# Patient Record
Sex: FEMALE | Race: WHITE | NOT HISPANIC OR LATINO | Employment: PART TIME | URBAN - NONMETROPOLITAN AREA
[De-identification: names, ages, dates, MRNs, and addresses within clinical notes are randomized per-mention and may not be internally consistent; named-entity substitution may affect disease eponyms.]

---

## 2023-05-01 PROBLEM — J32.9 SINUSITIS: Status: RESOLVED | Noted: 2023-05-01 | Resolved: 2023-05-01

## 2023-05-01 PROBLEM — O21.9 NAUSEA AND VOMITING DURING PREGNANCY (HHS-HCC): Status: RESOLVED | Noted: 2023-05-01 | Resolved: 2023-05-01

## 2023-05-01 PROBLEM — U07.1 COVID-19: Status: RESOLVED | Noted: 2023-05-01 | Resolved: 2023-05-01

## 2023-05-01 PROBLEM — M54.2 NECK PAIN: Status: ACTIVE | Noted: 2023-05-01

## 2023-05-01 PROBLEM — O91.219 MASTITIS, OBSTETRIC (HHS-HCC): Status: ACTIVE | Noted: 2023-05-01

## 2023-05-01 PROBLEM — R53.83 FATIGUE: Status: RESOLVED | Noted: 2023-05-01 | Resolved: 2023-05-01

## 2023-05-01 PROBLEM — J06.9 URI (UPPER RESPIRATORY INFECTION): Status: RESOLVED | Noted: 2023-05-01 | Resolved: 2023-05-01

## 2023-05-01 PROBLEM — E55.9 VITAMIN D DEFICIENCY: Status: ACTIVE | Noted: 2023-05-01

## 2023-05-01 PROBLEM — M50.20 HERNIATION OF INTERVERTEBRAL DISC OF CERVICAL SPINE WITHOUT RADICULOPATHY: Status: ACTIVE | Noted: 2023-05-01

## 2023-05-01 PROBLEM — F32.A DEPRESSION: Status: ACTIVE | Noted: 2023-05-01

## 2023-05-01 RX ORDER — DOXYLAMINE SUCCINATE AND PYRIDOXINE HYDROCHLORIDE 20; 20 MG/1; MG/1
TABLET, EXTENDED RELEASE ORAL
COMMUNITY
Start: 2019-06-20

## 2023-05-01 RX ORDER — ESCITALOPRAM OXALATE 10 MG/1
1 TABLET ORAL DAILY
COMMUNITY
Start: 2021-07-20

## 2023-05-01 RX ORDER — LORAZEPAM 0.5 MG/1
1 TABLET ORAL DAILY PRN
COMMUNITY
Start: 2021-06-08 | End: 2023-05-02 | Stop reason: SDUPTHER

## 2023-05-02 ENCOUNTER — OFFICE VISIT (OUTPATIENT)
Dept: PRIMARY CARE | Facility: CLINIC | Age: 33
End: 2023-05-02
Payer: COMMERCIAL

## 2023-05-02 VITALS
DIASTOLIC BLOOD PRESSURE: 71 MMHG | HEART RATE: 61 BPM | WEIGHT: 192.8 LBS | SYSTOLIC BLOOD PRESSURE: 102 MMHG | HEIGHT: 63 IN | BODY MASS INDEX: 34.16 KG/M2

## 2023-05-02 DIAGNOSIS — F32.A ANXIETY AND DEPRESSION: Primary | ICD-10-CM

## 2023-05-02 DIAGNOSIS — F41.9 ANXIETY AND DEPRESSION: Primary | ICD-10-CM

## 2023-05-02 PROCEDURE — 99213 OFFICE O/P EST LOW 20 MIN: CPT | Performed by: REGISTERED NURSE

## 2023-05-02 PROCEDURE — 1036F TOBACCO NON-USER: CPT | Performed by: REGISTERED NURSE

## 2023-05-02 RX ORDER — FLUOXETINE HYDROCHLORIDE 20 MG/1
20 CAPSULE ORAL DAILY
Qty: 30 CAPSULE | Refills: 1 | Status: SHIPPED | OUTPATIENT
Start: 2023-05-02 | End: 2023-07-01

## 2023-05-02 RX ORDER — IBUPROFEN 600 MG/1
TABLET ORAL
COMMUNITY
Start: 2022-09-15

## 2023-05-02 RX ORDER — FERROUS SULFATE TAB 325 MG (65 MG ELEMENTAL FE) 325 (65 FE) MG
1 TAB ORAL 2 TIMES DAILY
COMMUNITY
Start: 2022-07-20

## 2023-05-02 RX ORDER — LORAZEPAM 0.5 MG/1
0.5 TABLET ORAL EVERY 6 HOURS PRN
Qty: 28 TABLET | Refills: 0 | Status: SHIPPED | OUTPATIENT
Start: 2023-05-02 | End: 2023-05-09

## 2023-05-02 RX ORDER — OXYCODONE HYDROCHLORIDE 5 MG/1
TABLET ORAL
COMMUNITY
Start: 2022-09-15

## 2023-05-02 RX ORDER — ALBUTEROL SULFATE 90 UG/1
AEROSOL, METERED RESPIRATORY (INHALATION)
COMMUNITY
Start: 2022-07-03

## 2023-05-02 ASSESSMENT — ENCOUNTER SYMPTOMS
CONSTIPATION: 0
CONFUSION: 0
NERVOUS/ANXIOUS: 0
DIFFICULTY URINATING: 0
WEAKNESS: 0
EYE DISCHARGE: 0
COUGH: 0
WHEEZING: 0
HEADACHES: 0
CHILLS: 0
WOUND: 0
DIZZINESS: 0
NAUSEA: 0
SHORTNESS OF BREATH: 0
EYE REDNESS: 0
DIARRHEA: 0
FREQUENCY: 0
RHINORRHEA: 0
FEVER: 0
VOMITING: 0
ABDOMINAL PAIN: 0

## 2023-05-02 NOTE — PROGRESS NOTES
"Subjective   Patient ID: Kirti Mckoy is a 32 y.o. female who presents for Anxiety (Pt presents feeling anxiety; stress; would like to go back on mediation, fluoxetine;  ).    Anxiety  Patient reports no chest pain, confusion, dizziness, nausea, nervous/anxious behavior or shortness of breath.     Anxiety: She had stopped her medication for about a year or so. She states \"Life is catching up with me.\" And would like to go back on medication. She had previously been on fluoxitine and escitalopram in the past. She would like to try fluoxetine again. She states her son recently got diagnosed with autism so she is trying to deal with it.     Anxiety and depression: She states that she feels that she is functioning reasonably well, still feeling depressed and anxious. She is having bad days where she is scared to leave the house. She did go on her trip and she enjoyed some of it. She feels like she is dealing with more symptoms of PTSD at this point. Difficult to tell if the fluoxetine is helping, she is getting HA now that she didn't get before. She is struggling with flashbacks.      All other systems have been reviewed and are negative for complaint     Review of Systems   Constitutional:  Negative for chills and fever.   HENT:  Negative for congestion, ear pain and rhinorrhea.    Eyes:  Negative for discharge and redness.   Respiratory:  Negative for cough, shortness of breath and wheezing.    Cardiovascular:  Negative for chest pain and leg swelling.   Gastrointestinal:  Negative for abdominal pain, constipation, diarrhea, nausea and vomiting.   Genitourinary:  Negative for difficulty urinating, frequency and urgency.   Musculoskeletal:  Negative for gait problem.   Skin:  Negative for rash and wound.   Neurological:  Negative for dizziness, weakness and headaches.   Psychiatric/Behavioral:  Negative for confusion. The patient is not nervous/anxious.        Objective   /71 (BP Location: Right arm, Patient " "Position: Sitting, BP Cuff Size: Large adult)   Pulse 61   Ht 1.6 m (5' 3\")   Wt 87.5 kg (192 lb 12.8 oz)   BMI 34.15 kg/m²     Physical Exam  Vitals reviewed.   Constitutional:       Appearance: Normal appearance.   HENT:      Head: Normocephalic.      Right Ear: Tympanic membrane, ear canal and external ear normal.      Left Ear: Tympanic membrane, ear canal and external ear normal.      Nose: No rhinorrhea.      Mouth/Throat:      Mouth: Mucous membranes are moist.      Pharynx: Oropharynx is clear.   Eyes:      Pupils: Pupils are equal, round, and reactive to light.   Cardiovascular:      Rate and Rhythm: Normal rate and regular rhythm.      Pulses: Normal pulses.   Pulmonary:      Effort: Pulmonary effort is normal.      Breath sounds: Normal breath sounds.   Abdominal:      General: Abdomen is flat. Bowel sounds are normal.      Palpations: Abdomen is soft.   Musculoskeletal:         General: No tenderness. Normal range of motion.      Right lower leg: No edema.      Left lower leg: No edema.   Lymphadenopathy:      Cervical: No cervical adenopathy.   Skin:     General: Skin is warm and dry.      Findings: No rash.   Neurological:      Mental Status: She is alert and oriented to person, place, and time.   Psychiatric:         Mood and Affect: Mood normal.         Behavior: Behavior normal.       Assessment/Plan        #Depression  Would like to try fluoxetine again   May need to change to lexapro if SE's   lorazepam has been helpful, refilled    followup 6 weeks   "

## 2023-06-16 ENCOUNTER — APPOINTMENT (OUTPATIENT)
Dept: PRIMARY CARE | Facility: CLINIC | Age: 33
End: 2023-06-16
Payer: COMMERCIAL

## 2023-11-17 ENCOUNTER — TELEMEDICINE (OUTPATIENT)
Dept: PRIMARY CARE | Facility: CLINIC | Age: 33
End: 2023-11-17
Payer: COMMERCIAL

## 2023-11-17 DIAGNOSIS — J01.90 ACUTE SINUSITIS, RECURRENCE NOT SPECIFIED, UNSPECIFIED LOCATION: Primary | ICD-10-CM

## 2023-11-17 PROCEDURE — 99442 PR PHYS/QHP TELEPHONE EVALUATION 11-20 MIN: CPT | Performed by: FAMILY MEDICINE

## 2023-11-17 RX ORDER — AMOXICILLIN 500 MG/1
500 CAPSULE ORAL EVERY 12 HOURS SCHEDULED
Qty: 20 CAPSULE | Refills: 0 | Status: SHIPPED | OUTPATIENT
Start: 2023-11-17 | End: 2023-11-27

## 2023-11-17 NOTE — PROGRESS NOTES
Subjective   Patient ID: Kirti Mckoy is a 32 y.o. female who presents for No chief complaint on file..  HPI    Acute sinusitis: Started about a week ago. Nasal congestion, sneezing, coughing, fatigue. For the last 2 or 3 days drainage has turned tan/green. Low grade fevers. She found out she was pregnant a week ago. No nausea. No vomiting or diarrhea. +Sinus pressure in forehead and cheeks. No sore throat. Has not taken a COVID test.     Review of Systems    Objective   Physical Exam    Assessment/Plan        #Acute sinusitis  Rx sent amoxicillin  Fluids, rest, tylenol prn

## 2023-12-16 PROCEDURE — 87624 HPV HI-RISK TYP POOLED RSLT: CPT

## 2023-12-16 PROCEDURE — 88175 CYTOPATH C/V AUTO FLUID REDO: CPT

## 2023-12-16 PROCEDURE — 87800 DETECT AGNT MULT DNA DIREC: CPT

## 2023-12-19 ENCOUNTER — LAB REQUISITION (OUTPATIENT)
Dept: LAB | Facility: HOSPITAL | Age: 33
End: 2023-12-19
Payer: COMMERCIAL

## 2023-12-19 DIAGNOSIS — Z12.4 ENCOUNTER FOR SCREENING FOR MALIGNANT NEOPLASM OF CERVIX: ICD-10-CM

## 2023-12-19 DIAGNOSIS — Z34.81 ENCOUNTER FOR SUPERVISION OF OTHER NORMAL PREGNANCY, FIRST TRIMESTER (HHS-HCC): ICD-10-CM

## 2023-12-19 DIAGNOSIS — Z11.51 ENCOUNTER FOR SCREENING FOR HUMAN PAPILLOMAVIRUS (HPV): ICD-10-CM

## 2023-12-19 LAB
C TRACH RRNA SPEC QL NAA+PROBE: NEGATIVE
N GONORRHOEA DNA SPEC QL PROBE+SIG AMP: NEGATIVE

## 2023-12-27 ENCOUNTER — LAB (OUTPATIENT)
Dept: LAB | Facility: LAB | Age: 33
End: 2023-12-27
Payer: COMMERCIAL

## 2023-12-27 DIAGNOSIS — Z13.79 ENCOUNTER FOR OTHER SCREENING FOR GENETIC AND CHROMOSOMAL ANOMALIES: Primary | ICD-10-CM

## 2023-12-27 PROCEDURE — 36415 COLL VENOUS BLD VENIPUNCTURE: CPT

## 2024-01-04 DIAGNOSIS — Z34.81 ENCOUNTER FOR SUPERVISION OF OTHER NORMAL PREGNANCY, FIRST TRIMESTER (HHS-HCC): Primary | ICD-10-CM

## 2024-01-05 LAB
CYTOLOGY CMNT CVX/VAG CYTO-IMP: NORMAL
HPV HR 12 DNA GENITAL QL NAA+PROBE: NEGATIVE
HPV HR GENOTYPES PNL CVX NAA+PROBE: NEGATIVE
HPV16 DNA SPEC QL NAA+PROBE: NEGATIVE
HPV18 DNA SPEC QL NAA+PROBE: NEGATIVE
LAB AP HPV GENOTYPE QUESTION: YES
LAB AP HPV HR: NORMAL
LAB AP PAP ADDITIONAL TESTS: NORMAL
LABORATORY COMMENT REPORT: NORMAL
LMP START DATE: NORMAL
PATH REPORT.TOTAL CANCER: NORMAL

## 2024-01-10 ENCOUNTER — LAB (OUTPATIENT)
Dept: LAB | Facility: LAB | Age: 34
End: 2024-01-10
Payer: COMMERCIAL

## 2024-01-10 DIAGNOSIS — Z34.81 ENCOUNTER FOR SUPERVISION OF OTHER NORMAL PREGNANCY, FIRST TRIMESTER (HHS-HCC): ICD-10-CM

## 2024-01-10 LAB
ABO GROUP (TYPE) IN BLOOD: NORMAL
ANTIBODY SCREEN: NORMAL
ERYTHROCYTE [DISTWIDTH] IN BLOOD BY AUTOMATED COUNT: 12.3 % (ref 11.5–14.5)
HCT VFR BLD AUTO: 36.7 % (ref 36–46)
HGB BLD-MCNC: 12.4 G/DL (ref 12–16)
MCH RBC QN AUTO: 29.6 PG (ref 26–34)
MCHC RBC AUTO-ENTMCNC: 33.8 G/DL (ref 32–36)
MCV RBC AUTO: 88 FL (ref 80–100)
NRBC BLD-RTO: 0 /100 WBCS (ref 0–0)
PLATELET # BLD AUTO: 233 X10*3/UL (ref 150–450)
RBC # BLD AUTO: 4.19 X10*6/UL (ref 4–5.2)
RH FACTOR (ANTIGEN D): NORMAL
WBC # BLD AUTO: 5.6 X10*3/UL (ref 4.4–11.3)

## 2024-01-10 PROCEDURE — 86780 TREPONEMA PALLIDUM: CPT

## 2024-01-10 PROCEDURE — 87389 HIV-1 AG W/HIV-1&-2 AB AG IA: CPT

## 2024-01-10 PROCEDURE — 83036 HEMOGLOBIN GLYCOSYLATED A1C: CPT

## 2024-01-10 PROCEDURE — 87340 HEPATITIS B SURFACE AG IA: CPT

## 2024-01-10 PROCEDURE — 86803 HEPATITIS C AB TEST: CPT

## 2024-01-10 PROCEDURE — 86900 BLOOD TYPING SEROLOGIC ABO: CPT

## 2024-01-10 PROCEDURE — 86901 BLOOD TYPING SEROLOGIC RH(D): CPT

## 2024-01-10 PROCEDURE — 85027 COMPLETE CBC AUTOMATED: CPT

## 2024-01-10 PROCEDURE — 86850 RBC ANTIBODY SCREEN: CPT

## 2024-01-10 PROCEDURE — 86317 IMMUNOASSAY INFECTIOUS AGENT: CPT

## 2024-01-10 PROCEDURE — 36415 COLL VENOUS BLD VENIPUNCTURE: CPT

## 2024-01-10 PROCEDURE — 87086 URINE CULTURE/COLONY COUNT: CPT

## 2024-01-11 LAB
BACTERIA UR CULT: NORMAL
EST. AVERAGE GLUCOSE BLD GHB EST-MCNC: 94 MG/DL
HBA1C MFR BLD: 4.9 %
HBV SURFACE AG SERPL QL IA: NONREACTIVE
HCV AB SER QL: NONREACTIVE
HIV 1+2 AB+HIV1 P24 AG SERPL QL IA: NONREACTIVE
RUBV IGG SERPL IA-ACNC: 3.3 IA
RUBV IGG SERPL QL IA: POSITIVE
T PALLIDUM AB SER QL: NONREACTIVE

## 2024-01-31 LAB — SCAN RESULT: NORMAL

## 2024-04-24 ENCOUNTER — LAB (OUTPATIENT)
Dept: LAB | Facility: LAB | Age: 34
End: 2024-04-24
Payer: COMMERCIAL

## 2024-04-24 DIAGNOSIS — Z34.82 ENCOUNTER FOR SUPERVISION OF OTHER NORMAL PREGNANCY, SECOND TRIMESTER (HHS-HCC): Primary | ICD-10-CM

## 2024-04-24 LAB
ERYTHROCYTE [DISTWIDTH] IN BLOOD BY AUTOMATED COUNT: 12.1 % (ref 11.5–14.5)
GLUCOSE 1H P 50 G GLC PO SERPL-MCNC: 83 MG/DL
HCT VFR BLD AUTO: 31.4 % (ref 36–46)
HGB BLD-MCNC: 10.1 G/DL (ref 12–16)
MCH RBC QN AUTO: 28.1 PG (ref 26–34)
MCHC RBC AUTO-ENTMCNC: 32.2 G/DL (ref 32–36)
MCV RBC AUTO: 87 FL (ref 80–100)
NRBC BLD-RTO: 0 /100 WBCS (ref 0–0)
PLATELET # BLD AUTO: 225 X10*3/UL (ref 150–450)
RBC # BLD AUTO: 3.6 X10*6/UL (ref 4–5.2)
WBC # BLD AUTO: 8 X10*3/UL (ref 4.4–11.3)

## 2024-04-24 PROCEDURE — 85027 COMPLETE CBC AUTOMATED: CPT

## 2024-04-24 PROCEDURE — 36415 COLL VENOUS BLD VENIPUNCTURE: CPT

## 2024-04-24 PROCEDURE — 82947 ASSAY GLUCOSE BLOOD QUANT: CPT

## 2024-05-09 ENCOUNTER — APPOINTMENT (OUTPATIENT)
Dept: RADIOLOGY | Facility: HOSPITAL | Age: 34
End: 2024-05-09
Payer: COMMERCIAL

## 2024-05-09 ENCOUNTER — HOSPITAL ENCOUNTER (EMERGENCY)
Facility: HOSPITAL | Age: 34
Discharge: HOME | End: 2024-05-09
Attending: STUDENT IN AN ORGANIZED HEALTH CARE EDUCATION/TRAINING PROGRAM
Payer: COMMERCIAL

## 2024-05-09 VITALS
SYSTOLIC BLOOD PRESSURE: 110 MMHG | BODY MASS INDEX: 37.81 KG/M2 | RESPIRATION RATE: 18 BRPM | WEIGHT: 205.47 LBS | HEART RATE: 87 BPM | HEIGHT: 62 IN | DIASTOLIC BLOOD PRESSURE: 68 MMHG | TEMPERATURE: 98.4 F | OXYGEN SATURATION: 98 %

## 2024-05-09 DIAGNOSIS — M79.605 PAIN OF LEFT LOWER EXTREMITY: Primary | ICD-10-CM

## 2024-05-09 LAB
ANION GAP SERPL CALC-SCNC: 9 MMOL/L
BASOPHILS # BLD AUTO: 0.02 X10*3/UL (ref 0–0.1)
BASOPHILS NFR BLD AUTO: 0.3 %
BUN SERPL-MCNC: 6 MG/DL (ref 8–25)
CALCIUM SERPL-MCNC: 8.5 MG/DL (ref 8.5–10.4)
CHLORIDE SERPL-SCNC: 107 MMOL/L (ref 97–107)
CO2 SERPL-SCNC: 21 MMOL/L (ref 24–31)
CREAT SERPL-MCNC: 0.4 MG/DL (ref 0.4–1.6)
EGFRCR SERPLBLD CKD-EPI 2021: >90 ML/MIN/1.73M*2
EOSINOPHIL # BLD AUTO: 0.02 X10*3/UL (ref 0–0.7)
EOSINOPHIL NFR BLD AUTO: 0.3 %
ERYTHROCYTE [DISTWIDTH] IN BLOOD BY AUTOMATED COUNT: 12.4 % (ref 11.5–14.5)
GLUCOSE SERPL-MCNC: 77 MG/DL (ref 65–99)
HCT VFR BLD AUTO: 30 % (ref 36–46)
HGB BLD-MCNC: 9.5 G/DL (ref 12–16)
IMM GRANULOCYTES # BLD AUTO: 0.04 X10*3/UL (ref 0–0.7)
IMM GRANULOCYTES NFR BLD AUTO: 0.5 % (ref 0–0.9)
LYMPHOCYTES # BLD AUTO: 1.36 X10*3/UL (ref 1.2–4.8)
LYMPHOCYTES NFR BLD AUTO: 17.8 %
MCH RBC QN AUTO: 27.3 PG (ref 26–34)
MCHC RBC AUTO-ENTMCNC: 31.7 G/DL (ref 32–36)
MCV RBC AUTO: 86 FL (ref 80–100)
MONOCYTES # BLD AUTO: 0.57 X10*3/UL (ref 0.1–1)
MONOCYTES NFR BLD AUTO: 7.5 %
NEUTROPHILS # BLD AUTO: 5.61 X10*3/UL (ref 1.2–7.7)
NEUTROPHILS NFR BLD AUTO: 73.6 %
NRBC BLD-RTO: 0 /100 WBCS (ref 0–0)
PLATELET # BLD AUTO: 185 X10*3/UL (ref 150–450)
POTASSIUM SERPL-SCNC: 3.8 MMOL/L (ref 3.4–5.1)
RBC # BLD AUTO: 3.48 X10*6/UL (ref 4–5.2)
SODIUM SERPL-SCNC: 137 MMOL/L (ref 133–145)
WBC # BLD AUTO: 7.6 X10*3/UL (ref 4.4–11.3)

## 2024-05-09 PROCEDURE — 80048 BASIC METABOLIC PNL TOTAL CA: CPT

## 2024-05-09 PROCEDURE — 93971 EXTREMITY STUDY: CPT | Performed by: RADIOLOGY

## 2024-05-09 PROCEDURE — 93971 EXTREMITY STUDY: CPT

## 2024-05-09 PROCEDURE — 36415 COLL VENOUS BLD VENIPUNCTURE: CPT

## 2024-05-09 PROCEDURE — 99284 EMERGENCY DEPT VISIT MOD MDM: CPT | Mod: 25

## 2024-05-09 PROCEDURE — 85025 COMPLETE CBC W/AUTO DIFF WBC: CPT

## 2024-05-09 ASSESSMENT — PAIN DESCRIPTION - PAIN TYPE: TYPE: ACUTE PAIN

## 2024-05-09 ASSESSMENT — PAIN DESCRIPTION - DESCRIPTORS: DESCRIPTORS: ACHING

## 2024-05-09 ASSESSMENT — COLUMBIA-SUICIDE SEVERITY RATING SCALE - C-SSRS
6. HAVE YOU EVER DONE ANYTHING, STARTED TO DO ANYTHING, OR PREPARED TO DO ANYTHING TO END YOUR LIFE?: NO
2. HAVE YOU ACTUALLY HAD ANY THOUGHTS OF KILLING YOURSELF?: NO
1. IN THE PAST MONTH, HAVE YOU WISHED YOU WERE DEAD OR WISHED YOU COULD GO TO SLEEP AND NOT WAKE UP?: NO

## 2024-05-09 ASSESSMENT — PAIN DESCRIPTION - FREQUENCY: FREQUENCY: CONSTANT/CONTINUOUS

## 2024-05-09 ASSESSMENT — PAIN DESCRIPTION - ONSET: ONSET: GRADUAL

## 2024-05-09 ASSESSMENT — PAIN SCALES - GENERAL: PAINLEVEL_OUTOF10: 3

## 2024-05-09 ASSESSMENT — PAIN - FUNCTIONAL ASSESSMENT: PAIN_FUNCTIONAL_ASSESSMENT: 0-10

## 2024-05-09 ASSESSMENT — PAIN DESCRIPTION - LOCATION: LOCATION: LEG

## 2024-05-09 ASSESSMENT — PAIN DESCRIPTION - ORIENTATION: ORIENTATION: LEFT;LOWER

## 2024-05-09 ASSESSMENT — PAIN DESCRIPTION - PROGRESSION: CLINICAL_PROGRESSION: GRADUALLY WORSENING

## 2024-05-09 NOTE — ED PROVIDER NOTES
[facility]   Chief Complaint   Patient presents with    Leg Pain     Left calf pain since yesterday, no known trauma, no SOB, localized swelling, 30 weeks pregnant, pt has had sciatica issues, yesterday the pain went further up her leg, pt did walk in       HPI  Is a 33-year-old female who is 30 weeks pregnant who presents to ED for left leg pain.  Patient describes pain located behind left calf going up left thigh.  She denies any history of blood clots.  Denies any chest pain or shortness of breath, headache or dizziness, neurologic deficits.  Denies any trauma to the leg.                  No data recorded                   Patient History   Past Medical History:   Diagnosis Date    COVID-19 05/01/2023    Nausea and vomiting during pregnancy ([facility]-HCC) 05/01/2023    Sinusitis 05/01/2023    URI (upper respiratory infection) 05/01/2023     No past surgical history on file.  No family history on file.  Social History     Tobacco Use    Smoking status: Never     Passive exposure: Never    Smokeless tobacco: Never   Substance Use Topics    Alcohol use: Not Currently    Drug use: Never       Physical Exam   ED Triage Vitals [05/09/24 1042]   Temperature Heart Rate Respirations BP   36.9 °C (98.4 °F) 87 16 118/75      Pulse Ox Temp Source Heart Rate Source Patient Position   99 % Oral Monitor Sitting      BP Location FiO2 (%)     Right arm --       Physical Exam  Vitals reviewed.   Constitutional:       Appearance: Normal appearance.   HENT:      Head: Atraumatic.   Eyes:      Extraocular Movements: Extraocular movements intact.   Cardiovascular:      Rate and Rhythm: Normal rate and regular rhythm.   Pulmonary:      Effort: Pulmonary effort is normal.      Breath sounds: Normal breath sounds.   Musculoskeletal:         General: Normal range of motion.      Cervical back: Normal range of motion and neck supple.      Right lower leg: No edema.      Left lower leg: No edema.   Skin:     General: Skin is warm and dry.    Neurological:      General: No focal deficit present.      Mental Status: She is alert and oriented to person, place, and time.   Psychiatric:         Mood and Affect: Mood normal.         Behavior: Behavior normal.         ED Course & MDM   Diagnoses as of 05/09/24 1412   Pain of left lower extremity       Medical Decision Making  Parts of this chart have been completed using voice recognition software. Please excuse any errors of transcription.  My thought process and reason for plan has been formulated from the time that I saw the patient until the time of disposition and is not specific to one specific moment during their visit and furthermore my MDM encompasses this entire chart and not only this text box.    HPI:   Detailed above.    Exam:   A medically appropriate exam performed, outlined above, given the known history and presentation.    History obtained from:   Patient    EKG/Cardiac monitor:       Social Determinants of Health considered during this visit:       Medications given during visit:  Medications - No data to display     Diagnostic/tests:  Labs Reviewed   CBC WITH AUTO DIFFERENTIAL - Abnormal       Result Value    WBC 7.6      nRBC 0.0      RBC 3.48 (*)     Hemoglobin 9.5 (*)     Hematocrit 30.0 (*)     MCV 86      MCH 27.3      MCHC 31.7 (*)     RDW 12.4      Platelets 185      Neutrophils % 73.6      Immature Granulocytes %, Automated 0.5      Lymphocytes % 17.8      Monocytes % 7.5      Eosinophils % 0.3      Basophils % 0.3      Neutrophils Absolute 5.61      Immature Granulocytes Absolute, Automated 0.04      Lymphocytes Absolute 1.36      Monocytes Absolute 0.57      Eosinophils Absolute 0.02      Basophils Absolute 0.02     BASIC METABOLIC PANEL - Abnormal    Glucose 77      Sodium 137      Potassium 3.8      Chloride 107      Bicarbonate 21 (*)     Urea Nitrogen 6 (*)     Creatinine 0.40      eGFR >90      Calcium 8.5      Anion Gap 9        Lower extremity venous duplex left   Final  Result   No deep venous thrombosis of the  left lower extremity.        MACRO:   None        Signed by: Neli Jimenez 5/9/2024 12:02 PM   Dictation workstation:   [id number]           Differential Diagnosis:   As I have deemed necessary from their history, physical and studies, I have considered the following diagnoses:     DEEP VEIN THROMBOSIS  CELLULITIS  PERIPHERAL ARTERIAL DISEASE  ARTERIAL EMBOLISM CAUSING LIMB ISCHEMIA  BUERGER'S DISEASE  LIMB TRAUMA  NEUROSPINAL OR NEUROPATHIC PAIN  MUSCKULOSKELETAL PAIN  CHRONIC VENOUS DISEASE    Well's Score for DVT: -2  Active cancer (+1)  Bedridden > 3 days or major surgery within 12 weeks (+1)  Calf swelling > 3cm compared to other leg (+1)  Collateral superficial veins present (+1)  Entire leg swollen (+1)  Localized venous tenderness (+1)  Unilateral pitting edema (+1)  Paralysis of lower extremity (+1)  Prior DVT (+1)  Alternate diagnosis more likely (-2)      Consultations:      Procedures:      Critical Care:      Referrals:      Discharge Prescriptions:      MDM Summary:  DVT study is negative.  Very low suspicion for deep vein thrombosis.  Return precautions were discussed with patient.  She is safe for discharge.    We have discussed the diagnosis and risks, and we agree with discharging home to follow-up with appropriate physician as directed. We also discussed returning to the Emergency Department immediately if new or worsening symptoms occur. We have discussed the symptoms which are most concerning that necessitate immediate return. Pt symptoms have been well controlled here and the patient is safe for discharge with appropriate outpatient follow up. The patient has verbalized understanding to return to ER without delay for new or worsening pains or for any other symptoms or concerns. I utilized the discharge clinical management tool provided [employer] to help estimate risk of negative outcome for this patient.      Disposition:  The patient  was discharged      Procedure  Procedures     Osmany Martinez PA-C  05/09/24 6222

## 2024-06-25 ENCOUNTER — LAB REQUISITION (OUTPATIENT)
Dept: LAB | Facility: HOSPITAL | Age: 34
End: 2024-06-25
Payer: COMMERCIAL

## 2024-06-25 DIAGNOSIS — Z34.82 ENCOUNTER FOR SUPERVISION OF OTHER NORMAL PREGNANCY, SECOND TRIMESTER (HHS-HCC): ICD-10-CM

## 2024-06-25 PROCEDURE — 87081 CULTURE SCREEN ONLY: CPT

## 2024-06-28 LAB — GP B STREP GENITAL QL CULT: NORMAL

## 2024-07-12 ENCOUNTER — HOSPITAL ENCOUNTER (INPATIENT)
Facility: HOSPITAL | Age: 34
LOS: 3 days | Discharge: HOME | End: 2024-07-15
Attending: STUDENT IN AN ORGANIZED HEALTH CARE EDUCATION/TRAINING PROGRAM | Admitting: STUDENT IN AN ORGANIZED HEALTH CARE EDUCATION/TRAINING PROGRAM
Payer: COMMERCIAL

## 2024-07-12 ENCOUNTER — ANESTHESIA EVENT (OUTPATIENT)
Dept: OBSTETRICS AND GYNECOLOGY | Facility: HOSPITAL | Age: 34
End: 2024-07-12
Payer: COMMERCIAL

## 2024-07-12 DIAGNOSIS — G89.18 POST-OP PAIN: ICD-10-CM

## 2024-07-12 DIAGNOSIS — Z34.90 TERM PREGNANCY (HHS-HCC): ICD-10-CM

## 2024-07-12 PROBLEM — O42.90 PREMATURE RUPTURE OF MEMBRANES (HHS-HCC): Status: ACTIVE | Noted: 2024-07-12

## 2024-07-12 LAB
ABO GROUP (TYPE) IN BLOOD: NORMAL
ABO GROUP (TYPE) IN BLOOD: NORMAL
ANTIBODY SCREEN: NORMAL
ERYTHROCYTE [DISTWIDTH] IN BLOOD BY AUTOMATED COUNT: 14.2 % (ref 11.5–14.5)
HCT VFR BLD AUTO: 30.2 % (ref 36–46)
HGB BLD-MCNC: 9.7 G/DL (ref 12–16)
MCH RBC QN AUTO: 24.9 PG (ref 26–34)
MCHC RBC AUTO-ENTMCNC: 32.1 G/DL (ref 32–36)
MCV RBC AUTO: 77 FL (ref 80–100)
NRBC BLD-RTO: 0 /100 WBCS (ref 0–0)
PLATELET # BLD AUTO: 192 X10*3/UL (ref 150–450)
RBC # BLD AUTO: 3.9 X10*6/UL (ref 4–5.2)
RH FACTOR (ANTIGEN D): NORMAL
RH FACTOR (ANTIGEN D): NORMAL
WBC # BLD AUTO: 9 X10*3/UL (ref 4.4–11.3)

## 2024-07-12 PROCEDURE — 2500000001 HC RX 250 WO HCPCS SELF ADMINISTERED DRUGS (ALT 637 FOR MEDICARE OP): Performed by: STUDENT IN AN ORGANIZED HEALTH CARE EDUCATION/TRAINING PROGRAM

## 2024-07-12 PROCEDURE — 86901 BLOOD TYPING SEROLOGIC RH(D): CPT | Performed by: STUDENT IN AN ORGANIZED HEALTH CARE EDUCATION/TRAINING PROGRAM

## 2024-07-12 PROCEDURE — 7100000016 HC LABOR RECOVERY PER HOUR: Performed by: STUDENT IN AN ORGANIZED HEALTH CARE EDUCATION/TRAINING PROGRAM

## 2024-07-12 PROCEDURE — A4649 SURGICAL SUPPLIES: HCPCS | Performed by: STUDENT IN AN ORGANIZED HEALTH CARE EDUCATION/TRAINING PROGRAM

## 2024-07-12 PROCEDURE — 85027 COMPLETE CBC AUTOMATED: CPT | Performed by: STUDENT IN AN ORGANIZED HEALTH CARE EDUCATION/TRAINING PROGRAM

## 2024-07-12 PROCEDURE — 3700000014 HC AN EPIDURAL BLOCK CHARGE: Performed by: STUDENT IN AN ORGANIZED HEALTH CARE EDUCATION/TRAINING PROGRAM

## 2024-07-12 PROCEDURE — 2500000004 HC RX 250 GENERAL PHARMACY W/ HCPCS (ALT 636 FOR OP/ED): Performed by: STUDENT IN AN ORGANIZED HEALTH CARE EDUCATION/TRAINING PROGRAM

## 2024-07-12 PROCEDURE — 36415 COLL VENOUS BLD VENIPUNCTURE: CPT | Performed by: STUDENT IN AN ORGANIZED HEALTH CARE EDUCATION/TRAINING PROGRAM

## 2024-07-12 PROCEDURE — 59514 CESAREAN DELIVERY ONLY: CPT | Performed by: STUDENT IN AN ORGANIZED HEALTH CARE EDUCATION/TRAINING PROGRAM

## 2024-07-12 PROCEDURE — 2720000007 HC OR 272 NO HCPCS: Performed by: STUDENT IN AN ORGANIZED HEALTH CARE EDUCATION/TRAINING PROGRAM

## 2024-07-12 PROCEDURE — 86780 TREPONEMA PALLIDUM: CPT | Mod: TRILAB,WESLAB | Performed by: STUDENT IN AN ORGANIZED HEALTH CARE EDUCATION/TRAINING PROGRAM

## 2024-07-12 PROCEDURE — 59050 FETAL MONITOR W/REPORT: CPT

## 2024-07-12 PROCEDURE — 7210000002 HC LABOR PER HOUR

## 2024-07-12 PROCEDURE — 1220000001 HC OB SEMI-PRIVATE ROOM DAILY

## 2024-07-12 RX ORDER — OXYTOCIN 10 [USP'U]/ML
10 INJECTION, SOLUTION INTRAMUSCULAR; INTRAVENOUS ONCE AS NEEDED
Status: DISCONTINUED | OUTPATIENT
Start: 2024-07-12 | End: 2024-07-13

## 2024-07-12 RX ORDER — NIFEDIPINE 10 MG/1
10 CAPSULE ORAL ONCE AS NEEDED
Status: DISCONTINUED | OUTPATIENT
Start: 2024-07-12 | End: 2024-07-13

## 2024-07-12 RX ORDER — CARBOPROST TROMETHAMINE 250 UG/ML
250 INJECTION, SOLUTION INTRAMUSCULAR ONCE AS NEEDED
Status: DISCONTINUED | OUTPATIENT
Start: 2024-07-12 | End: 2024-07-13

## 2024-07-12 RX ORDER — TRANEXAMIC ACID 100 MG/ML
1000 INJECTION, SOLUTION INTRAVENOUS ONCE AS NEEDED
Status: DISCONTINUED | OUTPATIENT
Start: 2024-07-12 | End: 2024-07-13

## 2024-07-12 RX ORDER — MISOPROSTOL 200 UG/1
800 TABLET ORAL ONCE AS NEEDED
Status: DISCONTINUED | OUTPATIENT
Start: 2024-07-12 | End: 2024-07-13

## 2024-07-12 RX ORDER — ONDANSETRON 4 MG/1
4 TABLET, FILM COATED ORAL EVERY 6 HOURS PRN
Status: DISCONTINUED | OUTPATIENT
Start: 2024-07-12 | End: 2024-07-13

## 2024-07-12 RX ORDER — TERBUTALINE SULFATE 1 MG/ML
0.25 INJECTION SUBCUTANEOUS ONCE AS NEEDED
Status: DISCONTINUED | OUTPATIENT
Start: 2024-07-12 | End: 2024-07-13

## 2024-07-12 RX ORDER — METOCLOPRAMIDE HYDROCHLORIDE 5 MG/ML
10 INJECTION INTRAMUSCULAR; INTRAVENOUS EVERY 6 HOURS PRN
Status: DISCONTINUED | OUTPATIENT
Start: 2024-07-12 | End: 2024-07-13

## 2024-07-12 RX ORDER — SCOLOPAMINE TRANSDERMAL SYSTEM 1 MG/1
1 PATCH, EXTENDED RELEASE TRANSDERMAL ONCE AS NEEDED
Status: DISCONTINUED | OUTPATIENT
Start: 2024-07-12 | End: 2024-07-13

## 2024-07-12 RX ORDER — ACETAMINOPHEN 650 MG/1
SUPPOSITORY RECTAL
Status: COMPLETED
Start: 2024-07-12 | End: 2024-07-13

## 2024-07-12 RX ORDER — CEFAZOLIN SODIUM 2 G/100ML
2 INJECTION, SOLUTION INTRAVENOUS ONCE
Status: DISCONTINUED | OUTPATIENT
Start: 2024-07-12 | End: 2024-07-13

## 2024-07-12 RX ORDER — SODIUM CHLORIDE, SODIUM LACTATE, POTASSIUM CHLORIDE, CALCIUM CHLORIDE 600; 310; 30; 20 MG/100ML; MG/100ML; MG/100ML; MG/100ML
125 INJECTION, SOLUTION INTRAVENOUS CONTINUOUS
Status: DISCONTINUED | OUTPATIENT
Start: 2024-07-12 | End: 2024-07-13

## 2024-07-12 RX ORDER — FAMOTIDINE 10 MG/ML
20 INJECTION INTRAVENOUS ONCE
Status: COMPLETED | OUTPATIENT
Start: 2024-07-12 | End: 2024-07-12

## 2024-07-12 RX ORDER — METHYLERGONOVINE MALEATE 0.2 MG/ML
0.2 INJECTION INTRAVENOUS ONCE AS NEEDED
Status: DISCONTINUED | OUTPATIENT
Start: 2024-07-12 | End: 2024-07-13

## 2024-07-12 RX ORDER — LOPERAMIDE HYDROCHLORIDE 2 MG/1
4 CAPSULE ORAL EVERY 2 HOUR PRN
Status: DISCONTINUED | OUTPATIENT
Start: 2024-07-12 | End: 2024-07-13

## 2024-07-12 RX ORDER — LIDOCAINE HYDROCHLORIDE 10 MG/ML
30 INJECTION INFILTRATION; PERINEURAL ONCE AS NEEDED
Status: DISCONTINUED | OUTPATIENT
Start: 2024-07-12 | End: 2024-07-13

## 2024-07-12 RX ORDER — METOCLOPRAMIDE 10 MG/1
10 TABLET ORAL EVERY 6 HOURS PRN
Status: DISCONTINUED | OUTPATIENT
Start: 2024-07-12 | End: 2024-07-13

## 2024-07-12 RX ORDER — LABETALOL HYDROCHLORIDE 5 MG/ML
20 INJECTION, SOLUTION INTRAVENOUS ONCE AS NEEDED
Status: DISCONTINUED | OUTPATIENT
Start: 2024-07-12 | End: 2024-07-13

## 2024-07-12 RX ORDER — SODIUM CITRATE AND CITRIC ACID MONOHYDRATE 334; 500 MG/5ML; MG/5ML
30 SOLUTION ORAL ONCE
Status: COMPLETED | OUTPATIENT
Start: 2024-07-12 | End: 2024-07-12

## 2024-07-12 RX ORDER — HYDRALAZINE HYDROCHLORIDE 20 MG/ML
5 INJECTION INTRAMUSCULAR; INTRAVENOUS ONCE AS NEEDED
Status: DISCONTINUED | OUTPATIENT
Start: 2024-07-12 | End: 2024-07-13

## 2024-07-12 RX ORDER — METOCLOPRAMIDE HYDROCHLORIDE 5 MG/ML
10 INJECTION INTRAMUSCULAR; INTRAVENOUS ONCE
Status: COMPLETED | OUTPATIENT
Start: 2024-07-12 | End: 2024-07-12

## 2024-07-12 RX ORDER — OXYTOCIN/0.9 % SODIUM CHLORIDE 30/500 ML
60 PLASTIC BAG, INJECTION (ML) INTRAVENOUS ONCE AS NEEDED
Status: DISCONTINUED | OUTPATIENT
Start: 2024-07-12 | End: 2024-07-13

## 2024-07-12 RX ORDER — ONDANSETRON HYDROCHLORIDE 2 MG/ML
4 INJECTION, SOLUTION INTRAVENOUS EVERY 6 HOURS PRN
Status: DISCONTINUED | OUTPATIENT
Start: 2024-07-12 | End: 2024-07-13

## 2024-07-12 SDOH — SOCIAL STABILITY: SOCIAL INSECURITY: HAS ANYONE EVER THREATENED TO HURT YOUR FAMILY OR YOUR PETS?: NO

## 2024-07-12 SDOH — HEALTH STABILITY: MENTAL HEALTH: STRENGTHS (MUST CHOOSE TWO): SUPPORT FROM FAMILY;SUPPORT FROM FRIENDS

## 2024-07-12 SDOH — HEALTH STABILITY: MENTAL HEALTH: WERE YOU ABLE TO COMPLETE ALL THE BEHAVIORAL HEALTH SCREENINGS?: YES

## 2024-07-12 SDOH — SOCIAL STABILITY: SOCIAL NETWORK: ARE YOU MARRIED, WIDOWED, DIVORCED, SEPARATED, NEVER MARRIED, OR LIVING WITH A PARTNER?: MARRIED

## 2024-07-12 SDOH — HEALTH STABILITY: MENTAL HEALTH
STRESS IS WHEN SOMEONE FEELS TENSE, NERVOUS, ANXIOUS, OR CAN'T SLEEP AT NIGHT BECAUSE THEIR MIND IS TROUBLED. HOW STRESSED ARE YOU?: NOT AT ALL

## 2024-07-12 SDOH — SOCIAL STABILITY: SOCIAL INSECURITY: HAVE YOU HAD ANY THOUGHTS OF HARMING ANYONE ELSE?: NO

## 2024-07-12 SDOH — SOCIAL STABILITY: SOCIAL INSECURITY: ARE THERE ANY APPARENT SIGNS OF INJURIES/BEHAVIORS THAT COULD BE RELATED TO ABUSE/NEGLECT?: NO

## 2024-07-12 SDOH — SOCIAL STABILITY: SOCIAL INSECURITY: HAVE YOU HAD THOUGHTS OF HARMING ANYONE ELSE?: NO

## 2024-07-12 SDOH — SOCIAL STABILITY: SOCIAL INSECURITY: DO YOU FEEL ANYONE HAS EXPLOITED OR TAKEN ADVANTAGE OF YOU FINANCIALLY OR OF YOUR PERSONAL PROPERTY?: NO

## 2024-07-12 SDOH — ECONOMIC STABILITY: INCOME INSECURITY: HOW HARD IS IT FOR YOU TO PAY FOR THE VERY BASICS LIKE FOOD, HOUSING, MEDICAL CARE, AND HEATING?: NOT HARD AT ALL

## 2024-07-12 SDOH — HEALTH STABILITY: MENTAL HEALTH: HOW MANY STANDARD DRINKS CONTAINING ALCOHOL DO YOU HAVE ON A TYPICAL DAY?: PATIENT DOES NOT DRINK

## 2024-07-12 SDOH — SOCIAL STABILITY: SOCIAL NETWORK: HOW OFTEN DO YOU GET TOGETHER WITH FRIENDS OR RELATIVES?: MORE THAN THREE TIMES A WEEK

## 2024-07-12 SDOH — ECONOMIC STABILITY: FOOD INSECURITY: WITHIN THE PAST 12 MONTHS, THE FOOD YOU BOUGHT JUST DIDN'T LAST AND YOU DIDN'T HAVE MONEY TO GET MORE.: NEVER TRUE

## 2024-07-12 SDOH — SOCIAL STABILITY: SOCIAL INSECURITY: PHYSICAL ABUSE: DENIES, PROVIDER CONCERNED (COMMENT)

## 2024-07-12 SDOH — SOCIAL STABILITY: SOCIAL NETWORK
DO YOU BELONG TO ANY CLUBS OR ORGANIZATIONS SUCH AS CHURCH GROUPS UNIONS, FRATERNAL OR ATHLETIC GROUPS, OR SCHOOL GROUPS?: NO

## 2024-07-12 SDOH — SOCIAL STABILITY: SOCIAL INSECURITY: ABUSE SCREEN: ADULT

## 2024-07-12 SDOH — SOCIAL STABILITY: SOCIAL INSECURITY: WITHIN THE LAST YEAR, HAVE YOU BEEN HUMILIATED OR EMOTIONALLY ABUSED IN OTHER WAYS BY YOUR PARTNER OR EX-PARTNER?: NO

## 2024-07-12 SDOH — SOCIAL STABILITY: SOCIAL INSECURITY
WITHIN THE LAST YEAR, HAVE TO BEEN RAPED OR FORCED TO HAVE ANY KIND OF SEXUAL ACTIVITY BY YOUR PARTNER OR EX-PARTNER?: NO

## 2024-07-12 SDOH — SOCIAL STABILITY: SOCIAL INSECURITY: ARE YOU OR HAVE YOU BEEN THREATENED OR ABUSED PHYSICALLY, EMOTIONALLY, OR SEXUALLY BY ANYONE?: NO

## 2024-07-12 SDOH — HEALTH STABILITY: MENTAL HEALTH: HAVE YOU USED ANY SUBSTANCES (CANABIS, COCAINE, HEROIN, HALLUCINOGENS, INHALANTS, ETC.) IN THE PAST 12 MONTHS?: NO

## 2024-07-12 SDOH — SOCIAL STABILITY: SOCIAL NETWORK
IN A TYPICAL WEEK, HOW MANY TIMES DO YOU TALK ON THE PHONE WITH FAMILY, FRIENDS, OR NEIGHBORS?: MORE THAN THREE TIMES A WEEK

## 2024-07-12 SDOH — HEALTH STABILITY: MENTAL HEALTH: CURRENT SMOKER: 0

## 2024-07-12 SDOH — HEALTH STABILITY: MENTAL HEALTH: HOW OFTEN DO YOU HAVE A DRINK CONTAINING ALCOHOL?: NEVER

## 2024-07-12 SDOH — SOCIAL STABILITY: SOCIAL INSECURITY
WITHIN THE LAST YEAR, HAVE YOU BEEN KICKED, HIT, SLAPPED, OR OTHERWISE PHYSICALLY HURT BY YOUR PARTNER OR EX-PARTNER?: NO

## 2024-07-12 SDOH — SOCIAL STABILITY: SOCIAL NETWORK: HOW OFTEN DO YOU ATTENT MEETINGS OF THE CLUB OR ORGANIZATION YOU BELONG TO?: NEVER

## 2024-07-12 SDOH — HEALTH STABILITY: MENTAL HEALTH
HOW OFTEN DO YOU NEED TO HAVE SOMEONE HELP YOU WHEN YOU READ INSTRUCTIONS, PAMPHLETS, OR OTHER WRITTEN MATERIAL FROM YOUR DOCTOR OR PHARMACY?: NEVER

## 2024-07-12 SDOH — SOCIAL STABILITY: SOCIAL INSECURITY: WITHIN THE LAST YEAR, HAVE YOU BEEN AFRAID OF YOUR PARTNER OR EX-PARTNER?: NO

## 2024-07-12 SDOH — HEALTH STABILITY: MENTAL HEALTH: HOW OFTEN DO YOU HAVE 6 OR MORE DRINKS ON ONE OCCASION?: NEVER

## 2024-07-12 SDOH — HEALTH STABILITY: PHYSICAL HEALTH: ON AVERAGE, HOW MANY MINUTES DO YOU ENGAGE IN EXERCISE AT THIS LEVEL?: 60 MIN

## 2024-07-12 SDOH — HEALTH STABILITY: PHYSICAL HEALTH: ON AVERAGE, HOW MANY DAYS PER WEEK DO YOU ENGAGE IN MODERATE TO STRENUOUS EXERCISE (LIKE A BRISK WALK)?: 7 DAYS

## 2024-07-12 SDOH — HEALTH STABILITY: MENTAL HEALTH: SUICIDAL BEHAVIOR (LIFETIME): NO

## 2024-07-12 SDOH — HEALTH STABILITY: MENTAL HEALTH: WISH TO BE DEAD (PAST 1 MONTH): NO

## 2024-07-12 SDOH — ECONOMIC STABILITY: FOOD INSECURITY: WITHIN THE PAST 12 MONTHS, YOU WORRIED THAT YOUR FOOD WOULD RUN OUT BEFORE YOU GOT MONEY TO BUY MORE.: NEVER TRUE

## 2024-07-12 SDOH — HEALTH STABILITY: MENTAL HEALTH: NON-SPECIFIC ACTIVE SUICIDAL THOUGHTS (PAST 1 MONTH): NO

## 2024-07-12 SDOH — ECONOMIC STABILITY: HOUSING INSECURITY: DO YOU FEEL UNSAFE GOING BACK TO THE PLACE WHERE YOU ARE LIVING?: NO

## 2024-07-12 SDOH — ECONOMIC STABILITY: TRANSPORTATION INSECURITY
IN THE PAST 12 MONTHS, HAS LACK OF TRANSPORTATION KEPT YOU FROM MEETINGS, WORK, OR FROM GETTING THINGS NEEDED FOR DAILY LIVING?: NO

## 2024-07-12 SDOH — ECONOMIC STABILITY: TRANSPORTATION INSECURITY
IN THE PAST 12 MONTHS, HAS THE LACK OF TRANSPORTATION KEPT YOU FROM MEDICAL APPOINTMENTS OR FROM GETTING MEDICATIONS?: NO

## 2024-07-12 SDOH — HEALTH STABILITY: MENTAL HEALTH: HAVE YOU USED ANY PRESCRIPTION DRUGS OTHER THAN PRESCRIBED IN THE PAST 12 MONTHS?: NO

## 2024-07-12 SDOH — SOCIAL STABILITY: SOCIAL INSECURITY: DOES ANYONE TRY TO KEEP YOU FROM HAVING/CONTACTING OTHER FRIENDS OR DOING THINGS OUTSIDE YOUR HOME?: NO

## 2024-07-12 SDOH — SOCIAL STABILITY: SOCIAL INSECURITY: VERBAL ABUSE: DENIES, PROVIDER CONCERNED (COMMENT)

## 2024-07-12 SDOH — SOCIAL STABILITY: SOCIAL NETWORK: HOW OFTEN DO YOU ATTEND CHURCH OR RELIGIOUS SERVICES?: NEVER

## 2024-07-12 ASSESSMENT — ACTIVITIES OF DAILY LIVING (ADL)
PATIENT'S MEMORY ADEQUATE TO SAFELY COMPLETE DAILY ACTIVITIES?: YES
LACK_OF_TRANSPORTATION: NO
JUDGMENT_ADEQUATE_SAFELY_COMPLETE_DAILY_ACTIVITIES: YES
FEEDING YOURSELF: INDEPENDENT
ADEQUATE_TO_COMPLETE_ADL: YES
HEARING - LEFT EAR: FUNCTIONAL
WALKS IN HOME: INDEPENDENT
BATHING: INDEPENDENT
GROOMING: INDEPENDENT
DRESSING YOURSELF: INDEPENDENT
HEARING - RIGHT EAR: FUNCTIONAL
TOILETING: INDEPENDENT

## 2024-07-12 ASSESSMENT — LIFESTYLE VARIABLES
SKIP TO QUESTIONS 9-10: 1
SKIP TO QUESTIONS 9-10: 1
AUDIT-C TOTAL SCORE: 0
AUDIT-C TOTAL SCORE: 0
HOW OFTEN DO YOU HAVE A DRINK CONTAINING ALCOHOL: NEVER
HOW OFTEN DO YOU HAVE 6 OR MORE DRINKS ON ONE OCCASION: NEVER
AUDIT-C TOTAL SCORE: 0
HOW MANY STANDARD DRINKS CONTAINING ALCOHOL DO YOU HAVE ON A TYPICAL DAY: PATIENT DOES NOT DRINK

## 2024-07-12 ASSESSMENT — PATIENT HEALTH QUESTIONNAIRE - PHQ9
2. FEELING DOWN, DEPRESSED OR HOPELESS: NOT AT ALL
SUM OF ALL RESPONSES TO PHQ9 QUESTIONS 1 & 2: 0
1. LITTLE INTEREST OR PLEASURE IN DOING THINGS: NOT AT ALL

## 2024-07-12 ASSESSMENT — PAIN SCALES - GENERAL
PAINLEVEL_OUTOF10: 7

## 2024-07-13 LAB
ERYTHROCYTE [DISTWIDTH] IN BLOOD BY AUTOMATED COUNT: 14.3 % (ref 11.5–14.5)
HCT VFR BLD AUTO: 32.1 % (ref 36–46)
HGB BLD-MCNC: 9.9 G/DL (ref 12–16)
MCH RBC QN AUTO: 24.3 PG (ref 26–34)
MCHC RBC AUTO-ENTMCNC: 30.8 G/DL (ref 32–36)
MCV RBC AUTO: 79 FL (ref 80–100)
NRBC BLD-RTO: 0 /100 WBCS (ref 0–0)
PLATELET # BLD AUTO: 202 X10*3/UL (ref 150–450)
RBC # BLD AUTO: 4.08 X10*6/UL (ref 4–5.2)
WBC # BLD AUTO: 14.9 X10*3/UL (ref 4.4–11.3)

## 2024-07-13 PROCEDURE — 2500000004 HC RX 250 GENERAL PHARMACY W/ HCPCS (ALT 636 FOR OP/ED): Performed by: STUDENT IN AN ORGANIZED HEALTH CARE EDUCATION/TRAINING PROGRAM

## 2024-07-13 PROCEDURE — 0UB70ZZ EXCISION OF BILATERAL FALLOPIAN TUBES, OPEN APPROACH: ICD-10-PCS | Performed by: STUDENT IN AN ORGANIZED HEALTH CARE EDUCATION/TRAINING PROGRAM

## 2024-07-13 PROCEDURE — 2500000001 HC RX 250 WO HCPCS SELF ADMINISTERED DRUGS (ALT 637 FOR MEDICARE OP): Performed by: NURSE ANESTHETIST, CERTIFIED REGISTERED

## 2024-07-13 PROCEDURE — 88302 TISSUE EXAM BY PATHOLOGIST: CPT | Mod: TC | Performed by: STUDENT IN AN ORGANIZED HEALTH CARE EDUCATION/TRAINING PROGRAM

## 2024-07-13 PROCEDURE — 2500000004 HC RX 250 GENERAL PHARMACY W/ HCPCS (ALT 636 FOR OP/ED): Performed by: NURSE ANESTHETIST, CERTIFIED REGISTERED

## 2024-07-13 PROCEDURE — 85027 COMPLETE CBC AUTOMATED: CPT | Performed by: STUDENT IN AN ORGANIZED HEALTH CARE EDUCATION/TRAINING PROGRAM

## 2024-07-13 PROCEDURE — 36415 COLL VENOUS BLD VENIPUNCTURE: CPT | Performed by: STUDENT IN AN ORGANIZED HEALTH CARE EDUCATION/TRAINING PROGRAM

## 2024-07-13 PROCEDURE — 88302 TISSUE EXAM BY PATHOLOGIST: CPT | Performed by: PATHOLOGY

## 2024-07-13 PROCEDURE — 1220000001 HC OB SEMI-PRIVATE ROOM DAILY

## 2024-07-13 PROCEDURE — 7100000016 HC LABOR RECOVERY PER HOUR

## 2024-07-13 RX ORDER — DIPHENHYDRAMINE HCL 25 MG
25 TABLET ORAL EVERY 4 HOURS PRN
Status: DISCONTINUED | OUTPATIENT
Start: 2024-07-13 | End: 2024-07-15 | Stop reason: HOSPADM

## 2024-07-13 RX ORDER — BISACODYL 10 MG/1
10 SUPPOSITORY RECTAL DAILY PRN
Status: DISCONTINUED | OUTPATIENT
Start: 2024-07-13 | End: 2024-07-15 | Stop reason: HOSPADM

## 2024-07-13 RX ORDER — FENTANYL CITRATE 50 UG/ML
INJECTION, SOLUTION INTRAMUSCULAR; INTRAVENOUS AS NEEDED
Status: DISCONTINUED | OUTPATIENT
Start: 2024-07-13 | End: 2024-07-13

## 2024-07-13 RX ORDER — POLYETHYLENE GLYCOL 3350 17 G/17G
17 POWDER, FOR SOLUTION ORAL 2 TIMES DAILY PRN
Status: DISCONTINUED | OUTPATIENT
Start: 2024-07-13 | End: 2024-07-15 | Stop reason: HOSPADM

## 2024-07-13 RX ORDER — ONDANSETRON 4 MG/1
4 TABLET, FILM COATED ORAL EVERY 6 HOURS PRN
Status: DISCONTINUED | OUTPATIENT
Start: 2024-07-13 | End: 2024-07-15 | Stop reason: HOSPADM

## 2024-07-13 RX ORDER — KETOROLAC TROMETHAMINE 30 MG/ML
30 INJECTION, SOLUTION INTRAMUSCULAR; INTRAVENOUS EVERY 6 HOURS
Status: COMPLETED | OUTPATIENT
Start: 2024-07-13 | End: 2024-07-13

## 2024-07-13 RX ORDER — OXYCODONE HYDROCHLORIDE 5 MG/1
10 TABLET ORAL EVERY 4 HOURS PRN
Status: DISCONTINUED | OUTPATIENT
Start: 2024-07-14 | End: 2024-07-15 | Stop reason: HOSPADM

## 2024-07-13 RX ORDER — NIFEDIPINE 10 MG/1
10 CAPSULE ORAL ONCE AS NEEDED
Status: DISCONTINUED | OUTPATIENT
Start: 2024-07-13 | End: 2024-07-15 | Stop reason: HOSPADM

## 2024-07-13 RX ORDER — OXYTOCIN 10 [USP'U]/ML
10 INJECTION, SOLUTION INTRAMUSCULAR; INTRAVENOUS ONCE AS NEEDED
Status: DISCONTINUED | OUTPATIENT
Start: 2024-07-13 | End: 2024-07-15 | Stop reason: HOSPADM

## 2024-07-13 RX ORDER — METHYLERGONOVINE MALEATE 0.2 MG/ML
0.2 INJECTION INTRAVENOUS ONCE AS NEEDED
Status: COMPLETED | OUTPATIENT
Start: 2024-07-13 | End: 2024-07-13

## 2024-07-13 RX ORDER — PHENYLEPHRINE 10 MG/250 ML(40 MCG/ML)IN 0.9 % SOD.CHLORIDE INTRAVENOUS
CONTINUOUS PRN
Status: DISCONTINUED | OUTPATIENT
Start: 2024-07-13 | End: 2024-07-13

## 2024-07-13 RX ORDER — MISOPROSTOL 200 UG/1
800 TABLET ORAL ONCE AS NEEDED
Status: DISCONTINUED | OUTPATIENT
Start: 2024-07-13 | End: 2024-07-15 | Stop reason: HOSPADM

## 2024-07-13 RX ORDER — CARBOPROST TROMETHAMINE 250 UG/ML
250 INJECTION, SOLUTION INTRAMUSCULAR ONCE AS NEEDED
Status: DISCONTINUED | OUTPATIENT
Start: 2024-07-13 | End: 2024-07-15 | Stop reason: HOSPADM

## 2024-07-13 RX ORDER — SIMETHICONE 80 MG
80 TABLET,CHEWABLE ORAL 4 TIMES DAILY PRN
Status: DISCONTINUED | OUTPATIENT
Start: 2024-07-13 | End: 2024-07-15 | Stop reason: HOSPADM

## 2024-07-13 RX ORDER — OXYCODONE HYDROCHLORIDE 5 MG/1
5 TABLET ORAL EVERY 4 HOURS PRN
Status: DISCONTINUED | OUTPATIENT
Start: 2024-07-14 | End: 2024-07-15 | Stop reason: HOSPADM

## 2024-07-13 RX ORDER — TRANEXAMIC ACID 100 MG/ML
1000 INJECTION, SOLUTION INTRAVENOUS ONCE AS NEEDED
Status: DISCONTINUED | OUTPATIENT
Start: 2024-07-13 | End: 2024-07-15 | Stop reason: HOSPADM

## 2024-07-13 RX ORDER — HYDRALAZINE HYDROCHLORIDE 20 MG/ML
5 INJECTION INTRAMUSCULAR; INTRAVENOUS ONCE AS NEEDED
Status: DISCONTINUED | OUTPATIENT
Start: 2024-07-13 | End: 2024-07-15 | Stop reason: HOSPADM

## 2024-07-13 RX ORDER — LIDOCAINE 560 MG/1
1 PATCH PERCUTANEOUS; TOPICAL; TRANSDERMAL
Status: DISCONTINUED | OUTPATIENT
Start: 2024-07-13 | End: 2024-07-15 | Stop reason: HOSPADM

## 2024-07-13 RX ORDER — BUPIVACAINE HYDROCHLORIDE 7.5 MG/ML
INJECTION, SOLUTION EPIDURAL; RETROBULBAR AS NEEDED
Status: DISCONTINUED | OUTPATIENT
Start: 2024-07-13 | End: 2024-07-13

## 2024-07-13 RX ORDER — MORPHINE SULFATE 1 MG/ML
INJECTION, SOLUTION EPIDURAL; INTRATHECAL; INTRAVENOUS AS NEEDED
Status: DISCONTINUED | OUTPATIENT
Start: 2024-07-13 | End: 2024-07-13

## 2024-07-13 RX ORDER — LABETALOL HYDROCHLORIDE 5 MG/ML
20 INJECTION, SOLUTION INTRAVENOUS ONCE AS NEEDED
Status: DISCONTINUED | OUTPATIENT
Start: 2024-07-13 | End: 2024-07-15 | Stop reason: HOSPADM

## 2024-07-13 RX ORDER — KETOROLAC TROMETHAMINE 30 MG/ML
INJECTION, SOLUTION INTRAMUSCULAR; INTRAVENOUS AS NEEDED
Status: DISCONTINUED | OUTPATIENT
Start: 2024-07-13 | End: 2024-07-13

## 2024-07-13 RX ORDER — OXYTOCIN/0.9 % SODIUM CHLORIDE 30/500 ML
60 PLASTIC BAG, INJECTION (ML) INTRAVENOUS ONCE AS NEEDED
Status: DISCONTINUED | OUTPATIENT
Start: 2024-07-13 | End: 2024-07-15 | Stop reason: HOSPADM

## 2024-07-13 RX ORDER — CEFAZOLIN 1 G/1
INJECTION, POWDER, FOR SOLUTION INTRAVENOUS AS NEEDED
Status: DISCONTINUED | OUTPATIENT
Start: 2024-07-13 | End: 2024-07-13

## 2024-07-13 RX ORDER — IBUPROFEN 600 MG/1
600 TABLET ORAL EVERY 6 HOURS
Status: DISCONTINUED | OUTPATIENT
Start: 2024-07-14 | End: 2024-07-15 | Stop reason: HOSPADM

## 2024-07-13 RX ORDER — NALOXONE HYDROCHLORIDE 0.4 MG/ML
0.1 INJECTION, SOLUTION INTRAMUSCULAR; INTRAVENOUS; SUBCUTANEOUS EVERY 5 MIN PRN
Status: DISCONTINUED | OUTPATIENT
Start: 2024-07-13 | End: 2024-07-15 | Stop reason: HOSPADM

## 2024-07-13 RX ORDER — LOPERAMIDE HYDROCHLORIDE 2 MG/1
4 CAPSULE ORAL EVERY 2 HOUR PRN
Status: DISCONTINUED | OUTPATIENT
Start: 2024-07-13 | End: 2024-07-15 | Stop reason: HOSPADM

## 2024-07-13 RX ORDER — ONDANSETRON HYDROCHLORIDE 2 MG/ML
4 INJECTION, SOLUTION INTRAVENOUS EVERY 6 HOURS PRN
Status: DISCONTINUED | OUTPATIENT
Start: 2024-07-13 | End: 2024-07-15 | Stop reason: HOSPADM

## 2024-07-13 RX ORDER — ACETAMINOPHEN 120 MG/1
SUPPOSITORY RECTAL AS NEEDED
Status: DISCONTINUED | OUTPATIENT
Start: 2024-07-13 | End: 2024-07-13

## 2024-07-13 RX ORDER — ADHESIVE BANDAGE
10 BANDAGE TOPICAL
Status: DISCONTINUED | OUTPATIENT
Start: 2024-07-13 | End: 2024-07-15 | Stop reason: HOSPADM

## 2024-07-13 RX ORDER — DIPHENHYDRAMINE HYDROCHLORIDE 50 MG/ML
25 INJECTION INTRAMUSCULAR; INTRAVENOUS EVERY 4 HOURS PRN
Status: DISCONTINUED | OUTPATIENT
Start: 2024-07-13 | End: 2024-07-15 | Stop reason: HOSPADM

## 2024-07-13 RX ORDER — ACETAMINOPHEN 325 MG/1
975 TABLET ORAL EVERY 6 HOURS
Status: DISCONTINUED | OUTPATIENT
Start: 2024-07-13 | End: 2024-07-15 | Stop reason: HOSPADM

## 2024-07-13 ASSESSMENT — PAIN SCALES - GENERAL
PAINLEVEL_OUTOF10: 6
PAINLEVEL_OUTOF10: 2
PAINLEVEL_OUTOF10: 0 - NO PAIN
PAIN_LEVEL: 2
PAINLEVEL_OUTOF10: 4
PAINLEVEL_OUTOF10: 6
PAINLEVEL_OUTOF10: 0 - NO PAIN
PAIN_LEVEL: 3
PAINLEVEL_OUTOF10: 0 - NO PAIN

## 2024-07-13 ASSESSMENT — PAIN DESCRIPTION - LOCATION
LOCATION: ABDOMEN
LOCATION: ABDOMEN

## 2024-07-13 ASSESSMENT — PAIN DESCRIPTION - ORIENTATION: ORIENTATION: MID

## 2024-07-13 NOTE — ANESTHESIA POSTPROCEDURE EVALUATION
"Patient: Kirti Mckoy    Procedure Summary       Date: 24 Room / Location: [facility] TRI OB 02 / [facility] TRI OB    Anesthesia Start:  Anesthesia Stop: 24    Procedure: OBGYN Delivery  Section (Left: Abdomen) Diagnosis:       Term pregnancy ([facility]CC)      (NORMAL PREGNANCY)    Surgeons: Stefani Harvey MD Responsible Provider: SHARMILA Campos-CRNA    Anesthesia Type: spinal ASA Status: 2            Anesthesia Type: spinal  /64   Pulse 72   Temp 36.5 °C (97.7 °F) (Oral)   Resp 18   Ht 1.6 m (5' 3\")   Wt 96.2 kg (212 lb)   SpO2 (!) 94%   BMI 37.55 kg/m²     Vitals Value Taken Time   /64 244   Temp 36.5 24   Pulse 72 24   Resp 18 24   SpO2 94 % 24       Anesthesia Post Evaluation    Patient location during evaluation: bedside  Patient participation: complete - patient participated  Level of consciousness: awake and alert  Pain score: 3  Pain management: adequate  Airway patency: patent  Cardiovascular status: acceptable  Respiratory status: acceptable  Hydration status: acceptable  Postoperative Nausea and Vomiting: none        There were no known notable events for this encounter.    "

## 2024-07-13 NOTE — PROGRESS NOTES
Postpartum Progress Note    Assessment/Plan   Kirti Mckoy is a 33 y.o., , who delivered at 39w2d gestation and is now postpartum day 0.    POD#0 s/p repeat LTCS and BTL. Doing well, continue routine post-op care. Continue to monitor UOP closely, discontinue phelan when patient OOB. Start IV iron for acute on chronic anemia.        Principal Problem:    Premature rupture of membranes ([facility])    Pregnancy Problems (from 24 to present)       Problem Noted Resolved    Premature rupture of membranes ([facility]) 2024 by Stefani Harvey MD No    Priority:  Medium              Subjective   Feeling well. Pain controlled. Lochia has been light to moderate. Tolerating PO. Has not been OOB yet, phelan catheter in place.       Objective   Allergies:   Patient has no known allergies.         Last Vitals:  Temp Pulse Resp BP MAP Pulse Ox   36.5 °C (97.7 °F) 64 17 104/65   100 %     Vitals Min/Max Last 24 Hours:  Temp  Min: 36.5 °C (97.7 °F)  Max: 36.5 °C (97.7 °F)  Pulse  Min: 56  Max: 96  Resp  Min: 17  Max: 18  BP  Min: 91/54  Max: 132/73    Physical Exam:  AAOx3, NAD  CTAB, RRR  Abd soft, NT, mildly distended, fundus firm, midline  Dressing c/d/I   Ext 1+ edema BLE  Phelan catheter with ~100cc dark yellow urine, appears clearer in tubing      Lab Data:  Lab Results   Component Value Date    WBC 14.9 (H) 2024    HGB 9.9 (L) 2024    HCT 32.1 (L) 2024     2024

## 2024-07-13 NOTE — H&P
Obstetrical Admission History and Physical     Kirti Mckoy is a 33 y.o.  at 39w1d. SONIA: 2024, by Interfaced SONIA.     Chief Complaint: Contractions and Leakage/Loss of Fluid    Assessment/Plan    Plan to proceed with non-urgent repeat  section. Risks, benefits, alternatives have been reviewed. Patient also desires concurrent tubal sterilization. NPO in anticipation of surgery. Abx pre-op. Continuous EFM & toco until OR.      Principal Problem:    Premature rupture of membranes ([facility])      Pregnancy Problems (from 24 to present)       Problem Noted Resolved    Premature rupture of membranes ([facility]) 2024 by Stefani Harvey MD No    Priority:  Medium            Options for delivery have been discussed with the patient and she elects a vaginal delivery.  Labor has been discussed in detail. The risks, benefits, complications, alternatives, expected outcomes, potential problems during recuperation and recovery, and the risks of not performing the procedure were discussed with the patient. The patient stated understanding that the risks of delivery include, but are not limited to: death; reaction to medications; injury to bowel, bladder, ureters, uterus, cervix, vagina, and other pelvic and abdominal structures, infection; blood loss and possible need for transfusion; and potential need for surgery, including hysterectomy. The risks of injury to the infant during delivery were also discussed. All questions were answered. The patient is wishing to continue with plans for a vaginal delivery.    Admit to inpatient status. I anticipate that this patient will require a stay exceeding at least 2 midnights for delivery and postpartum.  Active management of rupture of membranes.  Management of pregnancy complications, as indicated.    Subjective   Kirti presents to Labor & Delivery with Spontaneous Rupture of Membranes of Clear fluid at 2030 hr on 24. Reports increasing contractions  since that time. Denies VB. +fetal movement.      Obstetrical History   OB History    Para Term  AB Living   4 2 2   1 2   SAB IAB Ectopic Multiple Live Births   1       2      # Outcome Date GA Lbr Mele/2nd Weight Sex Type Anes PTL Lv   4 Current            3 Term 22 39w1d  3.317 kg M CS-LTranv Spinal  DANIELITO   2 SAB 20        FD   1 Term 20 38w2d  4.111 kg M CS-LTranv EPI  DANIELITO      Complications: Failure to Progress in Second Stage       Past Medical History  Past Medical History:   Diagnosis Date    COVID-19 2023    Nausea and vomiting during pregnancy ([facility]) 2023    Sinusitis 2023    URI (upper respiratory infection) 2023        Past Surgical History   History reviewed. No pertinent surgical history.    Social History  Social History     Tobacco Use    Smoking status: Never     Passive exposure: Never    Smokeless tobacco: Never   Substance Use Topics    Alcohol use: Not Currently     Substance and Sexual Activity   Drug Use Never       Allergies  Patient has no known allergies.     Medications  Medications Prior to Admission   Medication Sig Dispense Refill Last Dose    FLUoxetine (PROzac) 20 mg capsule Take 1 capsule (20 mg) by mouth once daily. (Patient not taking: Reported on 2024) 30 capsule 1 More than a month    LORazepam (Ativan) 0.5 mg tablet Take 1 tablet (0.5 mg) by mouth every 6 hours if needed for anxiety for up to 7 days. (Patient not taking: Reported on 2024) 28 tablet 0 More than a month       Objective    Last Vitals  Temp Pulse Resp BP MAP O2 Sat   36.5 °C (97.7 °F) 80 18 91/54   99 %     Physical Examination  GENERAL: Examination reveals a well developed, well nourished, gravid female in no acute distress. She is alert and cooperative.  LUNGS: clear to auscultation bilaterally  HEART: regular rate and rhythm, S1, S2 normal, no murmur, click, rub or gallop  ABDOMEN: soft, gravid, nontender, nondistended, no abnormal masses, no  epigastric pain  FHR is Category 1  Jackson reading: q6min  The fetus is in a vertex presentation, determined by ultrasound  VAGINA: normal appearing vagina with normal color and discharge and no lesions noted  CERVIX: 1 cm dilated, 70 % effaced,   station; MEMBRANES are SROM  EXTREMITIES: no redness or tenderness in the calves or thighs, no edema  SKIN: normal coloration and turgor, no rashes  NEUROLOGICAL: DTRs normal and symmetrical  PSYCHOLOGICAL: awake and alert; oriented to person, place, and time    Lab Review  Rh positive  GBS negative

## 2024-07-13 NOTE — ANESTHESIA PREPROCEDURE EVALUATION
Patient: Kirti Mckoy    Evaluation Method: In-person visit    Procedure Information       Date/Time: 07/15/24 0730    Procedure: OBGYN Delivery  Section - REPEAT  x 2 @39.4 wks WITH TUBAL LIGATION @ 7:30AM ON 07/15/2024 WITH AS   Dr. Adan/Josephine/tez    Location: [facility] TRI OB 02 / [facility] TRI OB    Surgeons: Annamarie Adan MD            Relevant Problems   Anesthesia (within normal limits)      Cardiac (within normal limits)      Pulmonary (within normal limits)      Neuro   (+) Depression      GI (within normal limits)      /Renal (within normal limits)      Liver (within normal limits)      Endocrine (within normal limits)      Hematology (within normal limits)      Musculoskeletal (within normal limits)      HEENT (within normal limits)      ID (within normal limits)      Skin (within normal limits)      GYN (within normal limits)       Clinical information reviewed:   Tobacco  Allergies  Meds  Problems  Med Hx  Surg Hx   Fam Hx  Soc   Hx        NPO Detail:  NPO/Void Status  Carbohydrate Drink Given Prior to Surgery? : N  Date of Last Liquid: 24  Time of Last Liquid: 1230  Date of Last Solid: 24  Time of Last Solid: 1230  Time of Last Void: 5         OB/Gyn Evaluation    Present Pregnancy    Patient is pregnant now.  (+) , previous  section - repeat, multiple gestation   Obstetric History                Physical Exam    Airway  Mallampati: II  TM distance: >3 FB  Neck ROM: full     Cardiovascular - normal exam     Dental - normal exam     Pulmonary - normal exam     Abdominal - normal exam             Anesthesia Plan    History of general anesthesia?: yes  History of complications of general anesthesia?: no    ASA 2     spinal     The patient is not a current smoker.    Use of blood products discussed with who consented to blood products.    Plan discussed with CRNA.

## 2024-07-13 NOTE — CARE PLAN
Problem: Vaginal Birth or  Section  Goal: Fetal and maternal status remain reassuring during the birth process  Outcome: Progressing  Goal: Tolerate CRB for IOL placement maintenance until dislodgement/removal 12hrs after placement  Outcome: Progressing  Goal: Prevention of malpresentation/labor dystocia through delivery  Outcome: Progressing  Goal: Demonstrates labor coping techniques through delivery  Outcome: Progressing  Goal: Minimal s/sx of HDP and BP<160/110  Outcome: Progressing  Goal: No s/sx of infection through recovery  Outcome: Progressing  Goal: No s/sx of hemorrhage through recovery  Outcome: Progressing     Problem: Postpartum  Goal: Experiences normal postpartum course  Outcome: Progressing  Goal: Appropriate maternal -  bonding  Outcome: Progressing  Goal: Establish and maintain infant feeding pattern for adequate nutrition  Outcome: Progressing  Goal: Incisions, wounds, or drain sites healing without S/S of infection  Outcome: Progressing  Goal: No s/sx infection  Outcome: Progressing  Goal: No s/sx of hemorrhage  Outcome: Progressing  Goal: Minimal s/sx of HDP and BP<160/110  Outcome: Progressing     Problem:  Recovery Care  Goal: Verbalizes understanding of post-op instructions  Outcome: Progressing  Goal: Manages discomfort  Outcome: Progressing  Goal: Dressing intact until removed with any drainage marked  Outcome: Progressing  Goal: Patient vital signs are stable  Outcome: Progressing  Goal: Urine output is 0.5 mL/kg/hr or more  Outcome: Progressing  Goal: Fundus firm at midline  Outcome: Progressing     Problem: Anemia in pregnancy  Goal: Tolerates treatment for anemia  Outcome: Progressing     Problem: Pain - Adult  Goal: Verbalizes/displays adequate comfort level or baseline comfort level  Outcome: Progressing     Problem: Safety - Adult  Goal: Free from fall injury  Outcome: Progressing     Problem: Discharge Planning  Goal: Discharge to home or other facility  with appropriate resources  Outcome: Progressing

## 2024-07-13 NOTE — ANESTHESIA POSTPROCEDURE EVALUATION
Patient: Kirti Mckoy    Procedure Summary       Date: 24 Room / Location: [facility] OB 02 / [facility] TRI OB    Anesthesia Start:  Anesthesia Stop: 24    Procedure: OBGYN Delivery  Section (Left: Abdomen) Diagnosis:       Term pregnancy ([facility])      (NORMAL PREGNANCY)    Surgeons: Stefani Harvey MD Responsible Provider: LEON Campos    Anesthesia Type: spinal ASA Status: 2            Anesthesia Type: spinal    Vitals Value Taken Time   /73 247   Temp 36.5 °C (97.7 °F) 24   Pulse 63 24   Resp 17 24   SpO2 100 % 24       Anesthesia Post Evaluation    Patient location during evaluation: bedside  Patient participation: complete - patient participated  Level of consciousness: awake and alert  Pain score: 2  Pain management: adequate  Multimodal analgesia pain management approach  Airway patency: patent  Cardiovascular status: acceptable  Respiratory status: acceptable and room air  Hydration status: acceptable  Postoperative Nausea and Vomiting: mild  Comments: Pt. States she is overall comfortable. Pt. Does describe mild nausea that seems to have been relieved by the administration of ondansetron by nursing staff. Pt. Spinal insertion site assessed. Site is clean/dry/intact, no signs of trauma.     There were no known notable events for this encounter.

## 2024-07-13 NOTE — SIGNIFICANT EVENT
Called to patient bedside for passage of blood clot. 222cc QBL of clot and blood on chux. Fundal massage performed, uterus firm, midline at U-2, consistent w/ my previous exam in the OR. Pitocin still running at maintenance rate. IM Methergine given at this time.

## 2024-07-13 NOTE — ANESTHESIA PROCEDURE NOTES
Spinal Block    Patient location during procedure: OB  Start time: 7/13/2024 12:03 AM  End time: 7/13/2024 12:05 AM  Reason for block: primary anesthetic  Staffing  Performed: CRNA   Authorized by: LEON Campos    Performed by: LEON Campos    Preanesthetic Checklist  Completed: patient identified, IV checked, risks and benefits discussed, surgical consent, pre-op evaluation, timeout performed and sterile techniques followed  Block Timeout  RN/Licensed healthcare professional reads aloud to the Anesthesia provider and entire team: Patient identity, procedure with side and site, patient position, and as applicable the availability of implants/special equipment/special requirements.  Patient on coagulant treatment: no  Timeout performed at: 7/13/2024 12:03 AM  Spinal Block  Patient position: sitting  Prep: ChloraPrep  Sterility prep: cap, drape, gloves, gown, hand hygiene and mask  Sedation level: no sedation  Patient monitoring: blood pressure and continuous pulse oximetry  Approach: midline  Vertebral space: L4-5  Injection technique: single-shot  Needle  Needle type: pencil-point   Needle gauge: 22 G  Needle length: 5 in  Free flowing CSF: yes    Assessment  Sensory level: T6 bilateral  Block outcome: Allis test negative  Procedure assessment: patient tolerated procedure well with no immediate complications

## 2024-07-13 NOTE — L&D DELIVERY NOTE
OB Delivery Note  2024  Kirti Mckoy  33 y.o.   , Low Transverse       Gestational Age: 39w2d  /Para:   Quantitative Blood Loss: Admission to Discharge: 500 mL (2024 10:15 PM - 2024  2:30 AM)    Suad Mckoy [[id number]]      Labor Events     labor?: No  Sac identifier: Sac 1  Rupture date/time: 2024 2030  Rupture type: Spontaneous  Fluid color: Clear  Fluid odor: None  Labor type: Spontaneous Onset of Labor  Labor allowed to proceed with plans for an attempted vaginal birth?: No  Augmentation: None  Complications: None       Placenta    Placenta delivery date/time: 2024 0039  Placenta removal: Manual removal  Placenta appearance: Intact  Placenta disposition: discarded       Cord    Vessels: 3 vessels  Complications: None  Delayed cord clamping?: No  Cord blood disposition: Discarded  Gases sent?: No  Stem cell collection (by provider): No       Anesthesia    Method: Spinal       Operative Delivery    Forceps attempted?: No  Vacuum extractor attempted?: No       Shoulder Dystocia    Shoulder dystocia present?: No        Delivery    Birth date/time: 20[birth year] 00:38:00  Delivery type: , Low Transverse   categorization: repeat   priority: routine  Indications for : Repeat   Incision type: low transverse  Complications: None       Resuscitation    Method: None       Apgars    Living status: Living  Apgar Component Scores:  1 min.:  5 min.:  10 min.:  15 min.:  20 min.:    Skin color:  1  1       Heart rate:  2  2       Reflex irritability:  2  2       Muscle tone:  2  2       Respiratory effort:  2  2       Total:  9  9       Apgars assigned by: ANANTH BLACKMON RN       Delivery Providers    Delivering clinician: Stefani Harvey MD   Provider Role    Gifty Albright RN Delivery Nurse    Fanny Blackmon, RN Nursery Nurse    Bing Bedoya RN Surgical Assistant    Cony Navarro RN Scrub Nurse    Stefani THAO  MD Candice Obstetrician               Surgeon: Stefani Harvey MD  Assistant: SEAN Lawrence    Procedure: Repeat low transverse  section, bilateral partial salpingectomy    Pre-operative Diagnosis:  Intrauterine pregnancy at 39 weeks 2 days  Premature rupture of membranes  History of  x2  Desires permanent sterilization    Post-operative Diagnosis:  Same    Indication and Consent: This is a 33 y.o. year old G4 now P3 who presented for PROM. She had a history of  x2 and desired delivery via repeat . She also desired concurrent tubal sterilization. Risks, benefits, and alternatives were reviewed. Patient voiced understanding and wished to proceed.       Procedure Details: The patient was taken to the operating room where anesthesia was obtained.  She was placed in the dorsal supine position with a leftward lateral tilt.  Eugene catheter was inserted for bladder decompression.  She was prepped and draped in normal sterile fashion.    A Pfannenstiel incision was made with a scalpel and carried down sharply to the level of the fascia.  The fascia was nicked in the midline and the fascial defect was extended laterally with Biggs scissors.  The superior aspect of the fascia was elevated with Kocher clamps and the underlying rectus muscle was bluntly and sharply dissected off.  The inferior aspect of the fascia was treated in similar fashion.  The rectus muscles were  bluntly in the midline and the peritoneum was entered bluntly.  The peritoneal defect was extended with lateral traction.  A bladder blade was inserted to keep the bladder out of the operative field.  A low transverse uterine incision was made with a scalpel and the uterine incision was extended with upward and downward traction.  Membranes were ruptured and clear fluid was noted.  The fetal head was elevated to the level of the hysterotomy and the infant was delivered atraumatically with fundal pressure.  The  cord was clamped and cut and the infant was handed off to the waiting nursing staff.  The placenta was delivered with gentle traction and IV Pitocin was initiated to facilitate uterine contraction.  The uterus was exteriorized and the uterine cavity was wiped with laparotomy sponges to ensure complete removal of placental membranes.  The hysterotomy was closed with 0 Vicryl in running locked fashion.  Excellent hemostasis was noted.      Attention was turned to the fallopian tubes.  The right fallopian tube was elevated with a Jessee clamp and a window was created in the mesosalpinx.  The proximal and distal ends of a 3 cm segment were doubly ligated with 0 plain gut suture.  The 3 cm segment was excised using Metzenbaum scissors and handed off to be sent to pathology.  The cut ends were fulgurated with the Bovie with excellent hemostasis noted.  The left fallopian tube was treated in similar fashion.  The uterus was returned to the abdomen.  The paracolic gutters were cleared with moist laparotomy sponges.  The hysterotomy and fallopian tubes were reexamined and again excellent hemostasis was noted.  The fascia was closed with 0 Vicryl in running continuous fashion.  The subcutaneous tissue was irrigated with sterile saline and small bleeders were cauterized.  Subcutaneous tissue was reapproximated with 3-0 Vicryl.  The skin was closed with 4-0 Vicryl in subcutaneous fashion.    The patient tolerated the procedure well.  All counts were correct.  The patient was taken the recovery room in satisfactory stable condition.     Stefani Harvey MD

## 2024-07-14 LAB — TREPONEMA PALLIDUM IGG+IGM AB [PRESENCE] IN SERUM OR PLASMA BY IMMUNOASSAY: NONREACTIVE

## 2024-07-14 PROCEDURE — 2500000001 HC RX 250 WO HCPCS SELF ADMINISTERED DRUGS (ALT 637 FOR MEDICARE OP): Performed by: STUDENT IN AN ORGANIZED HEALTH CARE EDUCATION/TRAINING PROGRAM

## 2024-07-14 PROCEDURE — 2500000004 HC RX 250 GENERAL PHARMACY W/ HCPCS (ALT 636 FOR OP/ED): Performed by: STUDENT IN AN ORGANIZED HEALTH CARE EDUCATION/TRAINING PROGRAM

## 2024-07-14 PROCEDURE — 1220000001 HC OB SEMI-PRIVATE ROOM DAILY

## 2024-07-14 RX ORDER — DOCUSATE SODIUM 100 MG/1
100 CAPSULE, LIQUID FILLED ORAL 2 TIMES DAILY PRN
Status: DISCONTINUED | OUTPATIENT
Start: 2024-07-14 | End: 2024-07-15 | Stop reason: HOSPADM

## 2024-07-14 ASSESSMENT — PAIN DESCRIPTION - LOCATION
LOCATION: ABDOMEN

## 2024-07-14 ASSESSMENT — PAIN SCALES - GENERAL
PAINLEVEL_OUTOF10: 6
PAINLEVEL_OUTOF10: 4
PAINLEVEL_OUTOF10: 6
PAINLEVEL_OUTOF10: 6
PAINLEVEL_OUTOF10: 4
PAINLEVEL_OUTOF10: 5 - MODERATE PAIN

## 2024-07-14 NOTE — PROGRESS NOTES
Postpartum Progress Note    Assessment/Plan   Kirti Mckoy is a 33 y.o., , who delivered at 39w2d gestation and is now postpartum day 1.    POD#1 s/p repeat LTCS and BTL. Doing well, continue routine post-op care.      Principal Problem:    Premature rupture of membranes ([facility])    Pregnancy Problems (from 24 to present)       Problem Noted Resolved    Premature rupture of membranes ([facility]) 2024 by Stefani Harvey MD No    Priority:  Medium            Hospital course: no complications    Subjective   No acute issues overnight. C/o gas pains. Mild lochia. Tolerating PO. Ambulating. +flatus, no BM. Voiding spontaneously.       Objective   Allergies:   Patient has no known allergies.         Last Vitals:  Temp Pulse Resp BP MAP Pulse Ox   36.2 °C (97.2 °F) 62 18 106/61   97 %     Vitals Min/Max Last 24 Hours:  Temp  Min: 36.2 °C (97.2 °F)  Max: 36.8 °C (98.2 °F)  Pulse  Min: 62  Max: 73  Resp  Min: 18  Max: 18  BP  Min: 98/59  Max: 126/58    Physical Exam:  AAOx3, NAD  CTAB, RRR  Abd soft, NT, moderately distended, fundus firm, midline  Incision c/d/I, 2cm area of ecchymosis superior and inferior to incision line in the midline  Ext 1+ edema BLE

## 2024-07-14 NOTE — CARE PLAN
Problem: Postpartum  Goal: Experiences normal postpartum course  Outcome: Progressing  Goal: Appropriate maternal -  bonding  Outcome: Progressing  Goal: Establish and maintain infant feeding pattern for adequate nutrition  Outcome: Progressing  Goal: Incisions, wounds, or drain sites healing without S/S of infection  Outcome: Progressing  Goal: No s/sx infection  Outcome: Progressing  Goal: No s/sx of hemorrhage  Outcome: Progressing  Goal: Minimal s/sx of HDP and BP<160/110  Outcome: Progressing     Problem:  Recovery Care  Goal: Verbalizes understanding of post-op instructions  Outcome: Progressing  Goal: Manages discomfort  Outcome: Progressing  Goal: Dressing intact until removed with any drainage marked  Outcome: Progressing  Goal: Patient vital signs are stable  Outcome: Progressing     Problem: Anemia in pregnancy  Goal: Tolerates treatment for anemia  Outcome: Progressing     Problem: Pain - Adult  Goal: Verbalizes/displays adequate comfort level or baseline comfort level  Outcome: Progressing     Problem: Safety - Adult  Goal: Free from fall injury  Outcome: Progressing     Problem: Discharge Planning  Goal: Discharge to home or other facility with appropriate resources  Outcome: Progressing

## 2024-07-15 ENCOUNTER — ANESTHESIA (OUTPATIENT)
Dept: OBSTETRICS AND GYNECOLOGY | Facility: HOSPITAL | Age: 34
End: 2024-07-15
Payer: COMMERCIAL

## 2024-07-15 ENCOUNTER — PHARMACY VISIT (OUTPATIENT)
Dept: PHARMACY | Facility: CLINIC | Age: 34
End: 2024-07-15
Payer: MEDICARE

## 2024-07-15 VITALS
BODY MASS INDEX: 37.56 KG/M2 | OXYGEN SATURATION: 99 % | SYSTOLIC BLOOD PRESSURE: 121 MMHG | HEART RATE: 64 BPM | RESPIRATION RATE: 18 BRPM | TEMPERATURE: 97 F | WEIGHT: 212 LBS | DIASTOLIC BLOOD PRESSURE: 66 MMHG | HEIGHT: 63 IN

## 2024-07-15 PROBLEM — O42.90 PREMATURE RUPTURE OF MEMBRANES (HHS-HCC): Status: RESOLVED | Noted: 2024-07-12 | Resolved: 2024-07-15

## 2024-07-15 PROBLEM — O91.219 MASTITIS, OBSTETRIC (HHS-HCC): Status: RESOLVED | Noted: 2023-05-01 | Resolved: 2024-07-15

## 2024-07-15 PROCEDURE — RXMED WILLOW AMBULATORY MEDICATION CHARGE

## 2024-07-15 PROCEDURE — 2500000001 HC RX 250 WO HCPCS SELF ADMINISTERED DRUGS (ALT 637 FOR MEDICARE OP): Performed by: STUDENT IN AN ORGANIZED HEALTH CARE EDUCATION/TRAINING PROGRAM

## 2024-07-15 RX ORDER — OXYCODONE HYDROCHLORIDE 5 MG/1
5 TABLET ORAL EVERY 4 HOURS PRN
Qty: 15 TABLET | Refills: 0 | Status: SHIPPED | OUTPATIENT
Start: 2024-07-15

## 2024-07-15 RX ORDER — IBUPROFEN 600 MG/1
600 TABLET ORAL EVERY 6 HOURS
Qty: 30 TABLET | Refills: 0 | Status: SHIPPED | OUTPATIENT
Start: 2024-07-15

## 2024-07-15 RX ORDER — FERROUS SULFATE 325(65) MG
65 TABLET ORAL
Qty: 30 TABLET | Refills: 0 | Status: SHIPPED | OUTPATIENT
Start: 2024-07-16

## 2024-07-15 RX ORDER — FERROUS SULFATE 325(65) MG
65 TABLET ORAL
Status: DISCONTINUED | OUTPATIENT
Start: 2024-07-16 | End: 2024-07-15 | Stop reason: HOSPADM

## 2024-07-15 ASSESSMENT — PAIN DESCRIPTION - DESCRIPTORS
DESCRIPTORS: ACHING

## 2024-07-15 ASSESSMENT — PAIN SCALES - GENERAL
PAINLEVEL_OUTOF10: 4
PAINLEVEL_OUTOF10: 4
PAINLEVEL_OUTOF10: 6
PAINLEVEL_OUTOF10: 6
PAINLEVEL_OUTOF10: 3
PAINLEVEL_OUTOF10: 6

## 2024-07-15 NOTE — CARE PLAN
Problem: Postpartum  Goal: Experiences normal postpartum course  Outcome: Met  Goal: Appropriate maternal -  bonding  Outcome: Met  Goal: Establish and maintain infant feeding pattern for adequate nutrition  Outcome: Met  Goal: Incisions, wounds, or drain sites healing without S/S of infection  Outcome: Met  Goal: No s/sx infection  Outcome: Met  Goal: No s/sx of hemorrhage  Outcome: Met  Goal: Minimal s/sx of HDP and BP<160/110  Outcome: Met     Problem:  Recovery Care  Goal: Verbalizes understanding of post-op instructions  Outcome: Met  Goal: Manages discomfort  Outcome: Met  Goal: Dressing intact until removed with any drainage marked  Outcome: Met  Goal: Patient vital signs are stable  Outcome: Met     Problem: Anemia in pregnancy  Goal: Tolerates treatment for anemia  Outcome: Met     Problem: Pain - Adult  Goal: Verbalizes/displays adequate comfort level or baseline comfort level  Outcome: Met     Problem: Safety - Adult  Goal: Free from fall injury  Outcome: Met     Problem: Discharge Planning  Goal: Discharge to home or other facility with appropriate resources  Outcome: Met   The patient's goals for the shift include discharge    The clinical goals for the shift include discharge

## 2024-07-15 NOTE — LACTATION NOTE
Lactation Consultant Note    Met with mother. Mother is pumping and bottle feeding. Mother pumped and bottle fed with her last. Mother did have engorgement and mastitis with her last one so she is aware of the signs to look for and what to do if she has either. Mother has a hard area noted on her breast up near her arm pit. Mother stated she had the same exact spot with her last one when the milk was coming in and it went away on its own. Talked about gentle hand massage with mother and gave a name of IBCLC for massage videos. Expressed to mother that if it does not go away on its own again that she should follow up with her doctor. Dr. Harvey aware. Reviewed lactation resources and handouts. Continuing support offered as needed.

## 2024-07-15 NOTE — PROGRESS NOTES
"Social Work Brief Note     Patient: Kirti Mckoy   [place] Name: Baby Alissa Mckoy   [place] : 24      Reason for Visit: \"Risk Screen\"       History: Ms. Mckoy was admitted to [facility] [place] on 24 for delivery of her 3rd child, delivered baby girl on 24, and is scheduled to be discharged to home this day.       Assessment: Social work consult received due to \"risk screen.\"  on 24 spoke with OB nurse to discuss consult further and no concerns or needs identified.   This  (on 7/15/24) reviewed chart and identified Ms. Mckoy with history of depression and on medication to assist.  spoke with OB nurse who stated no concerns or needs.       Plan:  OB staff to reach out to social work should any needs arise.       Signature:  SONU Simmons   "

## 2024-07-15 NOTE — CARE PLAN
The patient's goals for the shift include pass gas    The clinical goals for the shift include pain control    Problem: Postpartum  Goal: No s/sx infection  Outcome: Progressing  Goal: No s/sx of hemorrhage  Outcome: Progressing  Goal: Minimal s/sx of HDP and BP<160/110  Outcome: Progressing

## 2024-07-15 NOTE — DISCHARGE SUMMARY
Discharge Summary    Admission Date: 2024  Discharge Date: 7/15/2024     Discharge Diagnosis  Premature rupture of membranes ([facility])    Hospital Course  Delivery Date: 2024 12:38 AM  Delivery type: , Low Transverse   GA at delivery: 39w2d  Outcome: Living  Anesthesia during delivery: Spinal  Intrapartum complications: None  Feeding method: Breastfeeding Status: Yes  Procedures: tubal ligation      Pertinent Physical Exam At Time of Discharge  AAOx3, NAD  CTAB, RRR  Abd soft, NT, mildly distended, fundus firm, midline  Incision c/d/I, 2cm area of ecchymosis superior and inferior to midpoint of incision, stable from previous exam  Ext 1+ edema BLE    Discharge Meds     Your medication list        START taking these medications        Instructions Last Dose Given Next Dose Due   ferrous sulfate (325 mg ferrous sulfate) tablet  Start taking on: 2024      Take 1 tablet by mouth once daily with breakfast. Do not fill before 2024.       ibuprofen 600 mg tablet      Take 1 tablet (600 mg) by mouth every 6 hours.       oxyCODONE 5 mg immediate release tablet  Commonly known as: Roxicodone      Take 1 tablet (5 mg) by mouth every 4 hours if needed (Pain score 6-8).              STOP taking these medications      FLUoxetine 20 mg capsule  Commonly known as: PROzac        LORazepam 0.5 mg tablet  Commonly known as: Ativan                  Where to Get Your Medications        These medications were sent to [facility]  [address], [address], [facility] [address]      Hours: 9 AM to 6 PM Mon-Fri, 9 AM to 1 PM Sat Phone: [phone]   ferrous sulfate (325 mg ferrous sulfate) tablet  ibuprofen 600 mg tablet  oxyCODONE 5 mg immediate release tablet          Complications Requiring Follow-Up  None    Test Results Pending At Discharge  Pending Labs       Order Current Status    Surgical Pathology Exam In process            Outpatient Follow-Up  Postpartum visit in 6  lizzie Harvey MD

## 2024-07-15 NOTE — PROGRESS NOTES
Postpartum Progress Note    Assessment/Plan   Kirti Mckoy is a 33 y.o., , who delivered at 39w2d gestation and is now postpartum day 2.    POD#2 s/p repeat LTCS and BTL. Doing well, stable for discharge home. Homegoing restrictions and followup reviewed.       Principal Problem:    Premature rupture of membranes ([facility])    Pregnancy Problems (from 24 to present)       Problem Noted Resolved    Premature rupture of membranes ([facility]) 2024 by Stefani Harvey MD No    Priority:  Medium            Hospital course: no complications    Subjective   No acute issues overnight. Pain controlled, gas pain has improved. Mild lochia. Tolerating PO. Ambulating. Requesting discharge home.       Objective   Allergies:   Patient has no known allergies.         Last Vitals:  Temp Pulse Resp BP MAP Pulse Ox   36.1 °C (97 °F) 64 18 121/66   99 %     Vitals Min/Max Last 24 Hours:  Temp  Min: 36.1 °C (97 °F)  Max: 36.9 °C (98.4 °F)  Pulse  Min: 64  Max: 74  Resp  Min: 18  Max: 20  BP  Min: 100/62  Max: 121/66    Physical Exam:  AAOx3, NAD  CTAB, RRR  Abd soft, NT, mildly distended, fundus firm, midline  Incision c/d/I, 2cm area of ecchymosis superior and inferior to midpoint of incision, stable from previous exam  Ext 1+ edema BLE

## 2024-07-15 NOTE — NURSING NOTE
Discharge instructions and prescriptions reviewed. Patient aware of need to make follow up appt with ob provider for 6 weeks. Patient verbalizes an understanding.

## 2024-07-16 LAB
LABORATORY COMMENT REPORT: NORMAL
PATH REPORT.FINAL DX SPEC: NORMAL
PATH REPORT.GROSS SPEC: NORMAL
PATH REPORT.RELEVANT HX SPEC: NORMAL
PATH REPORT.TOTAL CANCER: NORMAL

## 2025-08-05 ENCOUNTER — APPOINTMENT (OUTPATIENT)
Dept: PRIMARY CARE | Facility: CLINIC | Age: 35
End: 2025-08-05
Payer: COMMERCIAL

## 2025-08-05 VITALS
HEART RATE: 71 BPM | BODY MASS INDEX: 31.89 KG/M2 | DIASTOLIC BLOOD PRESSURE: 82 MMHG | WEIGHT: 180 LBS | HEIGHT: 63 IN | SYSTOLIC BLOOD PRESSURE: 123 MMHG

## 2025-08-05 DIAGNOSIS — F32.A ANXIETY AND DEPRESSION: ICD-10-CM

## 2025-08-05 DIAGNOSIS — E61.1 IRON DEFICIENCY: Primary | ICD-10-CM

## 2025-08-05 DIAGNOSIS — F41.9 ANXIETY AND DEPRESSION: ICD-10-CM

## 2025-08-05 PROCEDURE — 1036F TOBACCO NON-USER: CPT | Performed by: FAMILY MEDICINE

## 2025-08-05 PROCEDURE — 3008F BODY MASS INDEX DOCD: CPT | Performed by: FAMILY MEDICINE

## 2025-08-05 PROCEDURE — 99214 OFFICE O/P EST MOD 30 MIN: CPT | Performed by: FAMILY MEDICINE

## 2025-08-05 RX ORDER — FERROUS SULFATE 325(65) MG
325 TABLET, DELAYED RELEASE (ENTERIC COATED) ORAL
Qty: 90 TABLET | Refills: 1 | Status: SHIPPED | OUTPATIENT
Start: 2025-08-05 | End: 2026-08-05

## 2025-08-05 RX ORDER — ASCORBIC ACID 500 MG
500 TABLET ORAL DAILY
Qty: 90 TABLET | Refills: 1 | Status: SHIPPED | OUTPATIENT
Start: 2025-08-05 | End: 2026-08-05

## 2025-08-05 RX ORDER — ESCITALOPRAM OXALATE 10 MG/1
10 TABLET ORAL DAILY
Qty: 30 TABLET | Refills: 5 | Status: SHIPPED | OUTPATIENT
Start: 2025-08-05 | End: 2026-02-01

## 2025-08-05 NOTE — PROGRESS NOTES
"Subjective   Patient ID: Kirti Mckoy is a 34 y.o. female who presents for Anxiety and Depression (She saw her OB thinking it was postpartum issues because she also had unwanted facial hair, hair loss, and heavier periods. Her OB did labs and everything was normal).    Anxiety and depression: She thought it was post-partum and she went to see OB but they did labs and told her to followup with PCP. She hda her tubes tied. She has 3 kids now. She got checked out for pcos. Dizzy spells, fatigue, facial hair. She did not breast feed. Her periods are more normal. Not skipping her periods. She is feeling down/depressed. She is getting panic attacks 2 or 3 times per week. Sleep has been \"not great.\" It takes her a long time to fall asleep and she wakes up 2 or 3 times a night. Her daughter is 1 year old, she has some nights where she sleeps through the night and some nights where she gets up 3 times a night. She has been questioning sleep apnea but her  has told her she does not snore. She has issues with irritability and anger, and she is dealing with mom guilt. She is planning to get in with therapy. Her oldest Andi is profoundly autistic. They suspect that Nelson, her middle son, is also potentially autistic (4yo). She was on fluoxetine before, she is currently dealing with headaches every day.    Migraines: Dealing with HA every day. Normally worse toward the end of the day, but sometimes first thing in the morning. She has 1 cdup of coffee per day. She is working part time at [employer]. She is not taking ibuprofen regularly but currently taking ibuprofen once or twice a week.      All other systems have been reviewed and are negative for complaint     Objective   /82 (BP Location: Left arm, Patient Position: Sitting, BP Cuff Size: Large adult)   Pulse 71   Ht 1.6 m (5' 3\")   Wt 81.6 kg (180 lb)   Breastfeeding No   BMI 31.89 kg/m²     Gen: No acute distress, alert and oriented x3, pleasant "   HEENT: moist mucous membranes, b/l external auditory canals are clear of debris, TMs within normal limits, no oropharyngeal lesions, eomi, perrla   Neck: thyroid within normal limits, no lymphadenopathy   CV: RRR, normal S1/S2, no murmur   Resp: Clear to auscultation bilaterally, no wheezes or rhonchi appreciated  Abd: soft, nontender, non-distended, no guarding/rigidity, bowel sounds present  Extr: no edema, no calf tenderness  Derm: Skin is warm and dry, no rashes appreciated  Psych: mood is good, affect is congruent, good hygiene, normal speech and eye contact  Neuro: cranial nerves grossly intact, normal gait      Assessment/Plan   #Depression/Anxiety  HA on fluoxetine  Rx sent lexapro  Follow up 6 weeks    #FABI  Rx sent iron + vitamin C    #Migraines  Daily sx  Not interested in discussing daily medications

## 2025-09-23 ENCOUNTER — APPOINTMENT (OUTPATIENT)
Dept: PRIMARY CARE | Facility: CLINIC | Age: 35
End: 2025-09-23
Payer: COMMERCIAL

## (undated) DEVICE — GLOVE, SURGICAL, PROTEXIS PI , 6.5, PF, LF

## (undated) DEVICE — CAUTERY, PENCIL, PUSH BUTTON, SMOKE EVAC, 70MM

## (undated) DEVICE — PREP TRAY, VAGINAL

## (undated) DEVICE — Device

## (undated) DEVICE — PAD, GROUNDING, ELECTROSURGICAL, W/9 FT CABLE, POLYHESIVE II, ADULT, LF

## (undated) DEVICE — SUTURE, VICRYL, 3-0, 27 IN, CT-2, VIOLET

## (undated) DEVICE — SLEEVE, VASO PRESS, CALF GARMENT, MEDIUM, GREEN

## (undated) DEVICE — SUTURE, VICRYL 0, 36 IN, CT-1, VIOLET

## (undated) DEVICE — SUTURE, MONOCRYL, 4-0, 27 IN, PS-2, UNDYED